# Patient Record
Sex: MALE | Race: OTHER | Employment: FULL TIME | URBAN - METROPOLITAN AREA
[De-identification: names, ages, dates, MRNs, and addresses within clinical notes are randomized per-mention and may not be internally consistent; named-entity substitution may affect disease eponyms.]

---

## 2017-12-20 DIAGNOSIS — I10 ESSENTIAL HYPERTENSION: ICD-10-CM

## 2017-12-23 NOTE — TELEPHONE ENCOUNTER
LMTCB to schedule appt with another provider    Spoke with Robert Guzman at [place] [address] and verified patient picked up #30 Losartan 50 mg 12/08/17.

## 2017-12-29 RX ORDER — LOSARTAN POTASSIUM 50 MG/1
50 TABLET ORAL
Qty: 30 TABLET | Refills: 11 | Status: CANCELLED | OUTPATIENT
Start: 2017-12-29

## 2018-01-25 ENCOUNTER — OFFICE VISIT (OUTPATIENT)
Dept: INTERNAL MEDICINE CLINIC | Facility: CLINIC | Age: 58
End: 2018-01-25

## 2018-01-25 VITALS
SYSTOLIC BLOOD PRESSURE: 147 MMHG | WEIGHT: 269 LBS | OXYGEN SATURATION: 94 % | BODY MASS INDEX: 38.51 KG/M2 | DIASTOLIC BLOOD PRESSURE: 97 MMHG | HEART RATE: 70 BPM | TEMPERATURE: 98 F | HEIGHT: 70 IN

## 2018-01-25 DIAGNOSIS — J06.9 VIRAL UPPER RESPIRATORY TRACT INFECTION: Primary | ICD-10-CM

## 2018-01-25 DIAGNOSIS — I10 ESSENTIAL HYPERTENSION: ICD-10-CM

## 2018-01-25 PROCEDURE — 99213 OFFICE O/P EST LOW 20 MIN: CPT | Performed by: INTERNAL MEDICINE

## 2018-01-25 PROCEDURE — 99212 OFFICE O/P EST SF 10 MIN: CPT | Performed by: INTERNAL MEDICINE

## 2018-01-25 RX ORDER — LOSARTAN POTASSIUM 50 MG/1
50 TABLET ORAL
Qty: 90 TABLET | Refills: 3 | Status: SHIPPED | OUTPATIENT
Start: 2018-01-25 | End: 2019-02-03

## 2018-01-25 RX ORDER — BENZONATATE 100 MG/1
100 CAPSULE ORAL 2 TIMES DAILY PRN
Qty: 10 CAPSULE | Refills: 0 | Status: SHIPPED | OUTPATIENT
Start: 2018-01-25 | End: 2018-09-04

## 2018-01-25 NOTE — PROGRESS NOTES
Luiza Pablo is a 56year old male.   Patient presents with:  URI: cough, chest tightness and nasal congestion      HPI:   Pt comes as an urgent visit   C/c uri symptoms   C/o cough x jan 10th - getting better now   Initially had tightness of chest from co CARDIOVASCULAR: denies chest pain on exertion, palpitations, swelling in feet  NEURO: denies headaches , anxiety, depression    EXAM:   /97 (BP Location: Right arm, Patient Position: Sitting, Cuff Size: large)   Pulse 70   Temp 98.3 °F (36.8 °C) (O

## 2018-09-04 ENCOUNTER — OFFICE VISIT (OUTPATIENT)
Dept: INTERNAL MEDICINE CLINIC | Facility: CLINIC | Age: 58
End: 2018-09-04
Payer: COMMERCIAL

## 2018-09-04 VITALS
WEIGHT: 269 LBS | HEIGHT: 70 IN | BODY MASS INDEX: 38.51 KG/M2 | DIASTOLIC BLOOD PRESSURE: 94 MMHG | SYSTOLIC BLOOD PRESSURE: 146 MMHG | HEART RATE: 62 BPM

## 2018-09-04 DIAGNOSIS — I10 ESSENTIAL HYPERTENSION: ICD-10-CM

## 2018-09-04 DIAGNOSIS — R21 RASH: Primary | ICD-10-CM

## 2018-09-04 PROBLEM — E66.01 CLASS 2 SEVERE OBESITY DUE TO EXCESS CALORIES WITH SERIOUS COMORBIDITY AND BODY MASS INDEX (BMI) OF 38.0 TO 38.9 IN ADULT (HCC): Status: ACTIVE | Noted: 2018-09-04

## 2018-09-04 PROBLEM — E66.01 CLASS 2 SEVERE OBESITY DUE TO EXCESS CALORIES WITH SERIOUS COMORBIDITY AND BODY MASS INDEX (BMI) OF 35.0 TO 35.9 IN ADULT: Status: ACTIVE | Noted: 2018-09-04

## 2018-09-04 PROBLEM — E66.812 CLASS 2 SEVERE OBESITY DUE TO EXCESS CALORIES WITH SERIOUS COMORBIDITY AND BODY MASS INDEX (BMI) OF 35.0 TO 35.9 IN ADULT (HCC): Status: ACTIVE | Noted: 2018-09-04

## 2018-09-04 PROBLEM — E66.01 CLASS 2 SEVERE OBESITY DUE TO EXCESS CALORIES WITH SERIOUS COMORBIDITY AND BODY MASS INDEX (BMI) OF 35.0 TO 35.9 IN ADULT  (HCC): Status: ACTIVE | Noted: 2018-09-04

## 2018-09-04 PROBLEM — E66.01 CLASS 2 SEVERE OBESITY DUE TO EXCESS CALORIES WITH SERIOUS COMORBIDITY AND BODY MASS INDEX (BMI) OF 38.0 TO 38.9 IN ADULT  (HCC): Status: ACTIVE | Noted: 2018-09-04

## 2018-09-04 PROBLEM — E66.01 CLASS 2 SEVERE OBESITY DUE TO EXCESS CALORIES WITH SERIOUS COMORBIDITY AND BODY MASS INDEX (BMI) OF 38.0 TO 38.9 IN ADULT: Status: ACTIVE | Noted: 2018-09-04

## 2018-09-04 PROBLEM — E66.01 CLASS 2 SEVERE OBESITY DUE TO EXCESS CALORIES WITH SERIOUS COMORBIDITY AND BODY MASS INDEX (BMI) OF 35.0 TO 35.9 IN ADULT (HCC): Status: ACTIVE | Noted: 2018-09-04

## 2018-09-04 PROBLEM — E66.812 CLASS 2 SEVERE OBESITY DUE TO EXCESS CALORIES WITH SERIOUS COMORBIDITY AND BODY MASS INDEX (BMI) OF 38.0 TO 38.9 IN ADULT (HCC): Status: ACTIVE | Noted: 2018-09-04

## 2018-09-04 PROCEDURE — 99212 OFFICE O/P EST SF 10 MIN: CPT | Performed by: INTERNAL MEDICINE

## 2018-09-04 PROCEDURE — 99213 OFFICE O/P EST LOW 20 MIN: CPT | Performed by: INTERNAL MEDICINE

## 2018-09-04 RX ORDER — METHYLPREDNISOLONE 4 MG/1
TABLET ORAL
Qty: 1 KIT | Refills: 0 | Status: SHIPPED | OUTPATIENT
Start: 2018-09-04 | End: 2019-02-12

## 2018-09-04 NOTE — PROGRESS NOTES
Rod Vigil is a 66year old male.   Patient presents with:  Rash Skin Problem (integumentary): was in yard 10 days ago       HPI:   Pt comes as an urgent visit   C/c rash  C/o rash -- was in the yard -- in shorts mowing the lawn and was exposed to poison kg)   BMI 38.60 kg/m²   GENERAL: well developed, well nourished,in no apparent distress  SKIN: Noted excoriation on the left lower leg-most likely from scratching mild redness also mild redness on the left upper extremity lateral aspect mostly but also on

## 2018-09-04 NOTE — PATIENT INSTRUCTIONS
Low-Salt Choices  Eating salt (sodium) can make your body retain too much water. Excess water makes your heart work harder. Canned, packaged, and frozen foods are easy to prepare. But they are often high in sodium.  Here are some ideas for low-salt foods Will give Medrol Dosepak and continue icing the area, advised not to scratch the area at all can continue with calamine lotion, he can take Benadryl at night or even also during the day 2 if he is not driving as it can make him drowsy he can also try Pepci

## 2019-02-03 DIAGNOSIS — I10 ESSENTIAL HYPERTENSION: ICD-10-CM

## 2019-02-04 RX ORDER — LOSARTAN POTASSIUM 50 MG/1
50 TABLET ORAL
Qty: 90 TABLET | Refills: 3 | Status: SHIPPED | OUTPATIENT
Start: 2019-02-04 | End: 2019-02-12

## 2019-02-04 NOTE — TELEPHONE ENCOUNTER
Please review; protocol failed.     Hypertensive Medications  Protocol Criteria:  · Appointment scheduled in the past 6 months or in the next 3 months  · BMP or CMP in the past 12 months  · Creatinine result < 2  Recent Outpatient Visits            5 months

## 2019-02-12 ENCOUNTER — OFFICE VISIT (OUTPATIENT)
Dept: INTERNAL MEDICINE CLINIC | Facility: CLINIC | Age: 59
End: 2019-02-12
Payer: COMMERCIAL

## 2019-02-12 ENCOUNTER — LAB ENCOUNTER (OUTPATIENT)
Dept: LAB | Age: 59
End: 2019-02-12
Attending: INTERNAL MEDICINE
Payer: COMMERCIAL

## 2019-02-12 VITALS
HEART RATE: 73 BPM | WEIGHT: 273 LBS | BODY MASS INDEX: 39.08 KG/M2 | DIASTOLIC BLOOD PRESSURE: 110 MMHG | HEIGHT: 70 IN | SYSTOLIC BLOOD PRESSURE: 148 MMHG

## 2019-02-12 DIAGNOSIS — B02.9 HERPES ZOSTER WITHOUT COMPLICATION: ICD-10-CM

## 2019-02-12 DIAGNOSIS — I10 ESSENTIAL HYPERTENSION: ICD-10-CM

## 2019-02-12 DIAGNOSIS — I10 ESSENTIAL HYPERTENSION: Primary | ICD-10-CM

## 2019-02-12 LAB
ALBUMIN SERPL-MCNC: 4 G/DL (ref 3.4–5)
ALBUMIN/GLOB SERPL: 1 {RATIO} (ref 1–2)
ALP LIVER SERPL-CCNC: 80 U/L (ref 45–117)
ALT SERPL-CCNC: 39 U/L (ref 16–61)
ANION GAP SERPL CALC-SCNC: 8 MMOL/L (ref 0–18)
AST SERPL-CCNC: 28 U/L (ref 15–37)
BASOPHILS # BLD AUTO: 0.05 X10(3) UL (ref 0–0.2)
BASOPHILS NFR BLD AUTO: 0.6 %
BILIRUB SERPL-MCNC: 0.5 MG/DL (ref 0.1–2)
BUN BLD-MCNC: 13 MG/DL (ref 7–18)
BUN/CREAT SERPL: 13.3 (ref 10–20)
CALCIUM BLD-MCNC: 9.1 MG/DL (ref 8.5–10.1)
CHLORIDE SERPL-SCNC: 105 MMOL/L (ref 98–107)
CO2 SERPL-SCNC: 28 MMOL/L (ref 21–32)
CREAT BLD-MCNC: 0.98 MG/DL (ref 0.7–1.3)
DEPRECATED RDW RBC AUTO: 44 FL (ref 35.1–46.3)
EOSINOPHIL # BLD AUTO: 0.16 X10(3) UL (ref 0–0.7)
EOSINOPHIL NFR BLD AUTO: 2.1 %
ERYTHROCYTE [DISTWIDTH] IN BLOOD BY AUTOMATED COUNT: 13.6 % (ref 11–15)
GLOBULIN PLAS-MCNC: 4 G/DL (ref 2.8–4.4)
GLUCOSE BLD-MCNC: 98 MG/DL (ref 70–99)
HCT VFR BLD AUTO: 45.2 % (ref 39–53)
HGB BLD-MCNC: 14.7 G/DL (ref 13–17.5)
IMM GRANULOCYTES # BLD AUTO: 0.01 X10(3) UL (ref 0–1)
IMM GRANULOCYTES NFR BLD: 0.1 %
LYMPHOCYTES # BLD AUTO: 2.39 X10(3) UL (ref 1–4)
LYMPHOCYTES NFR BLD AUTO: 30.6 %
M PROTEIN MFR SERPL ELPH: 8 G/DL (ref 6.4–8.2)
MCH RBC QN AUTO: 28.8 PG (ref 26–34)
MCHC RBC AUTO-ENTMCNC: 32.5 G/DL (ref 31–37)
MCV RBC AUTO: 88.6 FL (ref 80–100)
MONOCYTES # BLD AUTO: 0.46 X10(3) UL (ref 0.1–1)
MONOCYTES NFR BLD AUTO: 5.9 %
NEUTROPHILS # BLD AUTO: 4.73 X10 (3) UL (ref 1.5–7.7)
NEUTROPHILS # BLD AUTO: 4.73 X10(3) UL (ref 1.5–7.7)
NEUTROPHILS NFR BLD AUTO: 60.7 %
OSMOLALITY SERPL CALC.SUM OF ELEC: 292 MOSM/KG (ref 275–295)
PLATELET # BLD AUTO: 274 10(3)UL (ref 150–450)
POTASSIUM SERPL-SCNC: 4.2 MMOL/L (ref 3.5–5.1)
RBC # BLD AUTO: 5.1 X10(6)UL (ref 4.3–5.7)
SODIUM SERPL-SCNC: 141 MMOL/L (ref 136–145)
WBC # BLD AUTO: 7.8 X10(3) UL (ref 4–11)

## 2019-02-12 PROCEDURE — 80053 COMPREHEN METABOLIC PANEL: CPT

## 2019-02-12 PROCEDURE — 85025 COMPLETE CBC W/AUTO DIFF WBC: CPT

## 2019-02-12 PROCEDURE — 36415 COLL VENOUS BLD VENIPUNCTURE: CPT

## 2019-02-12 PROCEDURE — 99214 OFFICE O/P EST MOD 30 MIN: CPT | Performed by: INTERNAL MEDICINE

## 2019-02-12 PROCEDURE — 99212 OFFICE O/P EST SF 10 MIN: CPT | Performed by: INTERNAL MEDICINE

## 2019-02-12 RX ORDER — VALACYCLOVIR HYDROCHLORIDE 1 G/1
1 TABLET, FILM COATED ORAL 3 TIMES DAILY
Qty: 21 TABLET | Refills: 0 | Status: SHIPPED | OUTPATIENT
Start: 2019-02-12 | End: 2019-02-19

## 2019-02-12 RX ORDER — LOSARTAN POTASSIUM 100 MG/1
100 TABLET ORAL
Qty: 90 TABLET | Refills: 1 | Status: SHIPPED | OUTPATIENT
Start: 2019-02-12 | End: 2019-08-13

## 2019-02-12 NOTE — PROGRESS NOTES
Jennifer Guzman is a 66year old male.   Patient presents with:  Rash: back area       HPI:   Pt comes as an urgent visit   C/c rash again   C/o has been sing hydrocortisone  , used a back scratcher and that made it worse   Before the hydrocortisone his wife distress  SKIN: left side back fluid filled  lesions-on the back in a dermatomal lesion distribution and noted also it extends to the right side and also on the left chest wall just a little bit--in clusters and red  HEENT: atraumatic, normocephalic  LUNGS

## 2019-02-12 NOTE — PATIENT INSTRUCTIONS
Eating for your heart doesn’t have to be hard or boring. You just need to know how to make healthier choices. The DASH eating plan has been developed to help you do just that. DASH stands for Dietary Approaches to Stop Hypertension.  It is a plan that has b visible fat. Broil, grill, roast, or boil instead of frying. Remove skin from poultry before eating.  Limit how much red meat you eat.  Nuts, seeds, beans  Servings: 4 to 5 a week  A serving is:  · One-third cup nuts (one and a half ounces)  · 2 tablespoons salt  Stay away from:  · Sausage, roberson, and ham  · Flour tortillas  · Packaged muffins, pancakes, and biscuits  · Instant hot cereals  · Cottage cheese  For lunch and dinner  · Fresh fish, chicken, turkey, or meat—baked, broiled, or roasted without salt

## 2019-02-16 ENCOUNTER — TELEPHONE (OUTPATIENT)
Dept: OTHER | Age: 59
End: 2019-02-16

## 2019-02-16 NOTE — TELEPHONE ENCOUNTER
Pt states he was seen in the office 2/12/19 and given prescription for Valacyclovir for rash on his upper back. Pt is taking the medication as prescribed but is still having itching, worse at night time.  Pt using Calamine lotion with minimal relief, asking

## 2019-02-16 NOTE — TELEPHONE ENCOUNTER
Spoke to patient, rash is drying up with a crust, he is using calamine lotion, advised patient that he can use Benadryl cream topically and at night may use Benadryl 25-50 mg at night, advised that medication causes sedation should not be driving if drowsy

## 2019-08-13 DIAGNOSIS — I10 ESSENTIAL HYPERTENSION: ICD-10-CM

## 2019-08-14 RX ORDER — LOSARTAN POTASSIUM 100 MG/1
TABLET ORAL
Qty: 90 TABLET | Refills: 0 | Status: SHIPPED | OUTPATIENT
Start: 2019-08-14 | End: 2020-02-26

## 2020-02-19 DIAGNOSIS — I10 ESSENTIAL HYPERTENSION: ICD-10-CM

## 2020-02-20 RX ORDER — LOSARTAN POTASSIUM 100 MG/1
TABLET ORAL
Qty: 30 TABLET | Refills: 2 | OUTPATIENT
Start: 2020-02-20

## 2020-02-22 NOTE — TELEPHONE ENCOUNTER
Patient scheduled for physical.  Future Appointments   Date Time Provider Marty Interiano   2/26/2020  3:00 PM Mechelle Jackson MD [facility]

## 2020-02-26 ENCOUNTER — OFFICE VISIT (OUTPATIENT)
Dept: INTERNAL MEDICINE CLINIC | Facility: CLINIC | Age: 60
End: 2020-02-26
Payer: COMMERCIAL

## 2020-02-26 VITALS
BODY MASS INDEX: 38.94 KG/M2 | WEIGHT: 272 LBS | HEIGHT: 70 IN | DIASTOLIC BLOOD PRESSURE: 88 MMHG | SYSTOLIC BLOOD PRESSURE: 134 MMHG | HEART RATE: 93 BPM

## 2020-02-26 DIAGNOSIS — G47.33 OSA (OBSTRUCTIVE SLEEP APNEA): ICD-10-CM

## 2020-02-26 DIAGNOSIS — Z00.00 PHYSICAL EXAM: Primary | ICD-10-CM

## 2020-02-26 DIAGNOSIS — J45.20 MILD INTERMITTENT ASTHMA WITHOUT COMPLICATION: ICD-10-CM

## 2020-02-26 DIAGNOSIS — Z12.11 COLON CANCER SCREENING: ICD-10-CM

## 2020-02-26 DIAGNOSIS — R21 RASH: ICD-10-CM

## 2020-02-26 DIAGNOSIS — Z12.5 ENCOUNTER FOR SCREENING FOR MALIGNANT NEOPLASM OF PROSTATE: ICD-10-CM

## 2020-02-26 DIAGNOSIS — E66.01 CLASS 2 SEVERE OBESITY DUE TO EXCESS CALORIES WITH SERIOUS COMORBIDITY AND BODY MASS INDEX (BMI) OF 38.0 TO 38.9 IN ADULT (HCC): ICD-10-CM

## 2020-02-26 DIAGNOSIS — I10 ESSENTIAL HYPERTENSION: ICD-10-CM

## 2020-02-26 PROCEDURE — 99396 PREV VISIT EST AGE 40-64: CPT | Performed by: INTERNAL MEDICINE

## 2020-02-26 RX ORDER — ALBUTEROL SULFATE 90 UG/1
2 AEROSOL, METERED RESPIRATORY (INHALATION) EVERY 4 HOURS PRN
Qty: 3 INHALER | Refills: 1 | Status: SHIPPED | OUTPATIENT
Start: 2020-02-26

## 2020-02-26 RX ORDER — LOSARTAN POTASSIUM 100 MG/1
100 TABLET ORAL
Qty: 90 TABLET | Refills: 3 | Status: SHIPPED | OUTPATIENT
Start: 2020-02-26 | End: 2020-05-28

## 2020-02-26 NOTE — PROGRESS NOTES
Cong Esparza is a 64year old male.   Patient presents with:  Physical      HPI:   Patient comes for physical  C/C physical  C/o kenji -- 2015 sleep study but never got the cpap         Works as police -- in the department      Problem list   htn  Asthma   O GENERAL: well developed, well nourished,in no apparent distress  SKIN: + rashes- macules in arms and legs   HEENT: atraumatic, normocephalic,ears and throat are clear,healing cold sore right lower lip , no frontal or maxillary sinus tenderness, pupils eq PSA SCREEN; Future  Will check   Rash  -     DERM - INTERNAL  will refer to derm     Preventive medicine  Colonoscopy–never had one so will refer  Labs to be done once fasting  Flu shot-he can come back for it or get it at work , he is not feeling 100% tod

## 2020-02-29 ENCOUNTER — LAB ENCOUNTER (OUTPATIENT)
Dept: LAB | Age: 60
End: 2020-02-29
Attending: INTERNAL MEDICINE
Payer: COMMERCIAL

## 2020-02-29 DIAGNOSIS — Z00.00 PHYSICAL EXAM: ICD-10-CM

## 2020-02-29 DIAGNOSIS — Z12.5 ENCOUNTER FOR SCREENING FOR MALIGNANT NEOPLASM OF PROSTATE: ICD-10-CM

## 2020-02-29 LAB
ALBUMIN SERPL-MCNC: 3.5 G/DL (ref 3.4–5)
ALBUMIN/GLOB SERPL: 0.8 {RATIO} (ref 1–2)
ALP LIVER SERPL-CCNC: 70 U/L (ref 45–117)
ALT SERPL-CCNC: 42 U/L (ref 16–61)
ANION GAP SERPL CALC-SCNC: 5 MMOL/L (ref 0–18)
AST SERPL-CCNC: 20 U/L (ref 15–37)
BASOPHILS # BLD AUTO: 0.05 X10(3) UL (ref 0–0.2)
BASOPHILS NFR BLD AUTO: 0.6 %
BILIRUB SERPL-MCNC: 0.3 MG/DL (ref 0.1–2)
BUN BLD-MCNC: 15 MG/DL (ref 7–18)
BUN/CREAT SERPL: 15 (ref 10–20)
CALCIUM BLD-MCNC: 8.8 MG/DL (ref 8.5–10.1)
CHLORIDE SERPL-SCNC: 107 MMOL/L (ref 98–112)
CHOLEST SMN-MCNC: 166 MG/DL (ref ?–200)
CO2 SERPL-SCNC: 30 MMOL/L (ref 21–32)
COMPLEXED PSA SERPL-MCNC: 6.03 NG/ML (ref ?–4)
CREAT BLD-MCNC: 1 MG/DL (ref 0.7–1.3)
DEPRECATED RDW RBC AUTO: 43.6 FL (ref 35.1–46.3)
EOSINOPHIL # BLD AUTO: 0.22 X10(3) UL (ref 0–0.7)
EOSINOPHIL NFR BLD AUTO: 2.7 %
ERYTHROCYTE [DISTWIDTH] IN BLOOD BY AUTOMATED COUNT: 13.2 % (ref 11–15)
GLOBULIN PLAS-MCNC: 4.2 G/DL (ref 2.8–4.4)
GLUCOSE BLD-MCNC: 103 MG/DL (ref 70–99)
HCT VFR BLD AUTO: 46 % (ref 39–53)
HDLC SERPL-MCNC: 41 MG/DL (ref 40–59)
HGB BLD-MCNC: 14.9 G/DL (ref 13–17.5)
IMM GRANULOCYTES # BLD AUTO: 0.01 X10(3) UL (ref 0–1)
IMM GRANULOCYTES NFR BLD: 0.1 %
LDLC SERPL CALC-MCNC: 109 MG/DL (ref ?–100)
LYMPHOCYTES # BLD AUTO: 2.87 X10(3) UL (ref 1–4)
LYMPHOCYTES NFR BLD AUTO: 35 %
M PROTEIN MFR SERPL ELPH: 7.7 G/DL (ref 6.4–8.2)
MCH RBC QN AUTO: 29 PG (ref 26–34)
MCHC RBC AUTO-ENTMCNC: 32.4 G/DL (ref 31–37)
MCV RBC AUTO: 89.5 FL (ref 80–100)
MONOCYTES # BLD AUTO: 0.57 X10(3) UL (ref 0.1–1)
MONOCYTES NFR BLD AUTO: 7 %
NEUTROPHILS # BLD AUTO: 4.47 X10 (3) UL (ref 1.5–7.7)
NEUTROPHILS # BLD AUTO: 4.47 X10(3) UL (ref 1.5–7.7)
NEUTROPHILS NFR BLD AUTO: 54.6 %
NONHDLC SERPL-MCNC: 125 MG/DL (ref ?–130)
OSMOLALITY SERPL CALC.SUM OF ELEC: 295 MOSM/KG (ref 275–295)
PATIENT FASTING Y/N/NP: YES
PATIENT FASTING Y/N/NP: YES
PLATELET # BLD AUTO: 315 10(3)UL (ref 150–450)
POTASSIUM SERPL-SCNC: 4.4 MMOL/L (ref 3.5–5.1)
RBC # BLD AUTO: 5.14 X10(6)UL (ref 4.3–5.7)
SODIUM SERPL-SCNC: 142 MMOL/L (ref 136–145)
TRIGL SERPL-MCNC: 82 MG/DL (ref 30–149)
TSI SER-ACNC: 3.66 MIU/ML (ref 0.36–3.74)
VLDLC SERPL CALC-MCNC: 16 MG/DL (ref 0–30)
WBC # BLD AUTO: 8.2 X10(3) UL (ref 4–11)

## 2020-02-29 PROCEDURE — 36415 COLL VENOUS BLD VENIPUNCTURE: CPT

## 2020-02-29 PROCEDURE — 80053 COMPREHEN METABOLIC PANEL: CPT

## 2020-02-29 PROCEDURE — 84443 ASSAY THYROID STIM HORMONE: CPT

## 2020-02-29 PROCEDURE — 85025 COMPLETE CBC W/AUTO DIFF WBC: CPT

## 2020-02-29 PROCEDURE — 80061 LIPID PANEL: CPT

## 2020-03-03 DIAGNOSIS — R97.20 ELEVATED PSA: Primary | ICD-10-CM

## 2020-04-21 ENCOUNTER — TELEPHONE (OUTPATIENT)
Dept: SURGERY | Facility: CLINIC | Age: 60
End: 2020-04-21

## 2020-04-21 NOTE — TELEPHONE ENCOUNTER
LMTCB called to assist on rescheduling his appointment to June or keeping it as a phone visit due to the COVID-19 crisis and CDC recommendations.

## 2020-05-28 ENCOUNTER — TELEPHONE (OUTPATIENT)
Dept: INTERNAL MEDICINE CLINIC | Facility: CLINIC | Age: 60
End: 2020-05-28

## 2020-05-28 DIAGNOSIS — I10 ESSENTIAL HYPERTENSION: ICD-10-CM

## 2020-05-28 RX ORDER — LOSARTAN POTASSIUM 100 MG/1
100 TABLET ORAL
Qty: 90 TABLET | Refills: 3 | Status: SHIPPED | OUTPATIENT
Start: 2020-05-28 | End: 2020-08-10

## 2020-06-04 ENCOUNTER — OFFICE VISIT (OUTPATIENT)
Dept: SURGERY | Facility: CLINIC | Age: 60
End: 2020-06-04
Payer: COMMERCIAL

## 2020-06-04 VITALS
SYSTOLIC BLOOD PRESSURE: 167 MMHG | BODY MASS INDEX: 39 KG/M2 | HEART RATE: 75 BPM | WEIGHT: 272 LBS | DIASTOLIC BLOOD PRESSURE: 121 MMHG

## 2020-06-04 DIAGNOSIS — R97.20 ELEVATED PSA: Primary | ICD-10-CM

## 2020-06-04 PROCEDURE — 99244 OFF/OP CNSLTJ NEW/EST MOD 40: CPT | Performed by: UROLOGY

## 2020-06-04 RX ORDER — CIPROFLOXACIN 500 MG/1
500 TABLET, FILM COATED ORAL 2 TIMES DAILY
Qty: 6 TABLET | Refills: 0 | Status: SHIPPED | OUTPATIENT
Start: 2020-06-04 | End: 2020-10-09

## 2020-06-04 RX ORDER — CEFDINIR 300 MG/1
300 CAPSULE ORAL EVERY 12 HOURS
Qty: 6 CAPSULE | Refills: 0 | Status: SHIPPED | OUTPATIENT
Start: 2020-06-04 | End: 2020-06-07

## 2020-06-04 NOTE — PROGRESS NOTES
SUBJECTIVE:  Algie Lennox is a 64year old male who presents for a consultation at the request of, and a copy of this note will be sent to, Dr. Morro Valencia, for evaluation of  elevated PSA. He states that the problem is unchanged.  Symptoms include noted reviewed and otherwise normal.    OBJECTIVE:  BP (!) 167/121 (BP Location: Left arm, Patient Position: Sitting, Cuff Size: large)   Pulse 75   Wt 272 lb (123.4 kg)   BMI 39.03 kg/m²   He appears well, in no apparent distress.   Alert and oriented times thre

## 2020-07-21 ENCOUNTER — PROCEDURE (OUTPATIENT)
Dept: SURGERY | Facility: CLINIC | Age: 60
End: 2020-07-21
Payer: COMMERCIAL

## 2020-07-21 ENCOUNTER — TELEPHONE (OUTPATIENT)
Dept: SURGERY | Facility: CLINIC | Age: 60
End: 2020-07-21

## 2020-07-21 VITALS
SYSTOLIC BLOOD PRESSURE: 148 MMHG | TEMPERATURE: 98 F | RESPIRATION RATE: 16 BRPM | HEIGHT: 70 IN | DIASTOLIC BLOOD PRESSURE: 100 MMHG | HEART RATE: 73 BPM | BODY MASS INDEX: 38.94 KG/M2 | WEIGHT: 272 LBS

## 2020-07-21 PROCEDURE — 3080F DIAST BP >= 90 MM HG: CPT | Performed by: UROLOGY

## 2020-07-21 PROCEDURE — 3008F BODY MASS INDEX DOCD: CPT | Performed by: UROLOGY

## 2020-07-21 PROCEDURE — 3077F SYST BP >= 140 MM HG: CPT | Performed by: UROLOGY

## 2020-07-21 RX ORDER — CEFDINIR 300 MG/1
CAPSULE ORAL
Qty: 3 CAPSULE | Refills: 0 | Status: SHIPPED | OUTPATIENT
Start: 2020-07-21 | End: 2020-10-09

## 2020-07-21 RX ORDER — CIPROFLOXACIN 500 MG/1
TABLET, FILM COATED ORAL
Qty: 3 TABLET | Refills: 0 | Status: SHIPPED | OUTPATIENT
Start: 2020-07-21 | End: 2020-10-09

## 2020-07-21 NOTE — TELEPHONE ENCOUNTER
We were unable to perform pt's transrectal US with prostate biopsies today d/t his very elevated diastolic BP.  He was reschd to 9/1 and since he started the ABX prep he will need 3 more tabs of Cipro 500 mg and 3 More caps of Cefdinir 300 mg. I sent these

## 2020-07-21 NOTE — PROGRESS NOTES
Patient presented for transrectal ultrasound and prostate biopsy. His diastolic blood pressure is too high and the procedure was postponed.

## 2020-08-10 ENCOUNTER — HOSPITAL ENCOUNTER (OUTPATIENT)
Dept: GENERAL RADIOLOGY | Age: 60
Discharge: HOME | End: 2020-08-10
Attending: FAMILY MEDICINE
Payer: COMMERCIAL

## 2020-08-10 ENCOUNTER — LAB ENCOUNTER (OUTPATIENT)
Dept: LAB | Age: 60
End: 2020-08-10
Attending: FAMILY MEDICINE
Payer: COMMERCIAL

## 2020-08-10 ENCOUNTER — OFFICE VISIT (OUTPATIENT)
Dept: FAMILY MEDICINE CLINIC | Facility: CLINIC | Age: 60
End: 2020-08-10
Payer: COMMERCIAL

## 2020-08-10 ENCOUNTER — APPOINTMENT (OUTPATIENT)
Dept: LAB | Age: 60
End: 2020-08-10
Attending: FAMILY MEDICINE
Payer: COMMERCIAL

## 2020-08-10 VITALS
SYSTOLIC BLOOD PRESSURE: 168 MMHG | HEIGHT: 70 IN | DIASTOLIC BLOOD PRESSURE: 120 MMHG | BODY MASS INDEX: 37.94 KG/M2 | HEART RATE: 71 BPM | WEIGHT: 265 LBS | TEMPERATURE: 98 F

## 2020-08-10 DIAGNOSIS — I10 ESSENTIAL HYPERTENSION: Primary | ICD-10-CM

## 2020-08-10 DIAGNOSIS — I10 ESSENTIAL HYPERTENSION: ICD-10-CM

## 2020-08-10 DIAGNOSIS — E66.01 SEVERE OBESITY (BMI 35.0-35.9 WITH COMORBIDITY) (HCC): ICD-10-CM

## 2020-08-10 DIAGNOSIS — R97.20 ELEVATED PSA: ICD-10-CM

## 2020-08-10 LAB
ANION GAP SERPL CALC-SCNC: 3 MMOL/L (ref 0–18)
BILIRUB UR QL: NEGATIVE
BUN BLD-MCNC: 12 MG/DL (ref 7–18)
BUN/CREAT SERPL: 13.2 (ref 10–20)
CALCIUM BLD-MCNC: 9.3 MG/DL (ref 8.5–10.1)
CHLORIDE SERPL-SCNC: 108 MMOL/L (ref 98–112)
CO2 SERPL-SCNC: 30 MMOL/L (ref 21–32)
COLOR UR: YELLOW
CREAT BLD-MCNC: 0.91 MG/DL (ref 0.7–1.3)
GLUCOSE BLD-MCNC: 86 MG/DL (ref 70–99)
GLUCOSE UR-MCNC: NEGATIVE MG/DL
HGB UR QL STRIP.AUTO: NEGATIVE
KETONES UR-MCNC: NEGATIVE MG/DL
NITRITE UR QL STRIP.AUTO: NEGATIVE
OSMOLALITY SERPL CALC.SUM OF ELEC: 291 MOSM/KG (ref 275–295)
PATIENT FASTING Y/N/NP: NO
PH UR: 5 [PH] (ref 5–8)
POTASSIUM SERPL-SCNC: 4.1 MMOL/L (ref 3.5–5.1)
PROT UR-MCNC: NEGATIVE MG/DL
RBC #/AREA URNS AUTO: 1 /HPF
SODIUM SERPL-SCNC: 141 MMOL/L (ref 136–145)
SP GR UR STRIP: 1.03 (ref 1–1.03)
UROBILINOGEN UR STRIP-ACNC: <2
WBC #/AREA URNS AUTO: 7 /HPF

## 2020-08-10 PROCEDURE — 93005 ELECTROCARDIOGRAM TRACING: CPT

## 2020-08-10 PROCEDURE — 93010 ELECTROCARDIOGRAM REPORT: CPT | Performed by: FAMILY MEDICINE

## 2020-08-10 PROCEDURE — 80048 BASIC METABOLIC PNL TOTAL CA: CPT

## 2020-08-10 PROCEDURE — 3080F DIAST BP >= 90 MM HG: CPT | Performed by: FAMILY MEDICINE

## 2020-08-10 PROCEDURE — 71046 X-RAY EXAM CHEST 2 VIEWS: CPT | Performed by: FAMILY MEDICINE

## 2020-08-10 PROCEDURE — 81001 URINALYSIS AUTO W/SCOPE: CPT

## 2020-08-10 PROCEDURE — 87086 URINE CULTURE/COLONY COUNT: CPT

## 2020-08-10 PROCEDURE — 3008F BODY MASS INDEX DOCD: CPT | Performed by: FAMILY MEDICINE

## 2020-08-10 PROCEDURE — 36415 COLL VENOUS BLD VENIPUNCTURE: CPT

## 2020-08-10 PROCEDURE — 99203 OFFICE O/P NEW LOW 30 MIN: CPT | Performed by: FAMILY MEDICINE

## 2020-08-10 PROCEDURE — 3077F SYST BP >= 140 MM HG: CPT | Performed by: FAMILY MEDICINE

## 2020-08-10 RX ORDER — AMLODIPINE BESYLATE AND BENAZEPRIL HYDROCHLORIDE 5; 40 MG/1; MG/1
1 CAPSULE ORAL DAILY
Qty: 90 CAPSULE | Refills: 0 | Status: SHIPPED | OUTPATIENT
Start: 2020-08-10 | End: 2020-09-10

## 2020-08-10 NOTE — PROGRESS NOTES
Patient ID: Peggy Reed is a 64year old male. HPI  Patient presents with:  Physical: establish care    Telephone encounter from 7/21/2020.     Karl Mazariegos RN   Registered Nurse      Telephone Encounter   Signed   Encounter Date:  7/21/2020 2/29/2020.     [id number] Procedure with Mulugeta Yi MD   Tuesday September 1 11:00 AM  [facility], [address], [address] [place] [address] [facility]  [place] [address] [phone]          Lab Results   Component Value Date 3. 83     Wt Readings from Last 6 Encounters:  08/10/20 : 265 lb (120.2 kg)  07/21/20 : 272 lb (123.4 kg)  06/04/20 : 272 lb (123.4 kg)  02/26/20 : 272 lb (123.4 kg)  02/12/19 : 273 lb (123.8 kg)  09/04/18 : 269 lb (122 kg)      BMI Readings from Last 6 Enc mouth 2 (two) times daily. 6 tablet 0   • losartan 100 MG Oral Tab Take 1 tablet (100 mg total) by mouth once daily.  90 tablet 3   • Albuterol Sulfate HFA (PROAIR HFA) 108 (90 Base) MCG/ACT Inhalation Aero Soln Inhale 2 puffs into the lungs every 4 (four) Do labs today. Patient understands the plan. Elevated PSA  Can have a prostate biopsy until the blood pressure is better.   Severe obesity (BMI 35.0-35.9 with comorbidity) ([facility] [address]        Referrals (if applicable)  No orders of the defined types were placed

## 2020-08-26 ENCOUNTER — HOSPITAL ENCOUNTER (OUTPATIENT)
Dept: CV DIAGNOSTICS | Age: 60
Discharge: HOME | End: 2020-08-26
Attending: FAMILY MEDICINE
Payer: COMMERCIAL

## 2020-08-26 DIAGNOSIS — I77.1 TORTUOUS AORTA (HCC): ICD-10-CM

## 2020-08-26 DIAGNOSIS — I10 ESSENTIAL HYPERTENSION: ICD-10-CM

## 2020-08-26 DIAGNOSIS — E66.01 SEVERE OBESITY (BMI 35.0-35.9 WITH COMORBIDITY) (HCC): ICD-10-CM

## 2020-08-26 PROCEDURE — 93306 TTE W/DOPPLER COMPLETE: CPT | Performed by: FAMILY MEDICINE

## 2020-08-27 ENCOUNTER — OFFICE VISIT (OUTPATIENT)
Dept: FAMILY MEDICINE CLINIC | Facility: CLINIC | Age: 60
End: 2020-08-27
Payer: COMMERCIAL

## 2020-08-27 ENCOUNTER — LAB ENCOUNTER (OUTPATIENT)
Dept: LAB | Age: 60
End: 2020-08-27
Attending: FAMILY MEDICINE
Payer: COMMERCIAL

## 2020-08-27 VITALS
HEIGHT: 70 IN | DIASTOLIC BLOOD PRESSURE: 90 MMHG | SYSTOLIC BLOOD PRESSURE: 120 MMHG | TEMPERATURE: 98 F | HEART RATE: 74 BPM | WEIGHT: 264 LBS | BODY MASS INDEX: 37.8 KG/M2

## 2020-08-27 DIAGNOSIS — R97.20 ELEVATED PSA: ICD-10-CM

## 2020-08-27 DIAGNOSIS — I10 ESSENTIAL HYPERTENSION: ICD-10-CM

## 2020-08-27 DIAGNOSIS — I10 ESSENTIAL HYPERTENSION: Primary | ICD-10-CM

## 2020-08-27 LAB
ANION GAP SERPL CALC-SCNC: 4 MMOL/L (ref 0–18)
BUN BLD-MCNC: 14 MG/DL (ref 7–18)
BUN/CREAT SERPL: 15.4 (ref 10–20)
CALCIUM BLD-MCNC: 9.2 MG/DL (ref 8.5–10.1)
CHLORIDE SERPL-SCNC: 106 MMOL/L (ref 98–112)
CO2 SERPL-SCNC: 30 MMOL/L (ref 21–32)
CREAT BLD-MCNC: 0.91 MG/DL (ref 0.7–1.3)
GLUCOSE BLD-MCNC: 98 MG/DL (ref 70–99)
OSMOLALITY SERPL CALC.SUM OF ELEC: 290 MOSM/KG (ref 275–295)
PATIENT FASTING Y/N/NP: YES
POTASSIUM SERPL-SCNC: 4.1 MMOL/L (ref 3.5–5.1)
SODIUM SERPL-SCNC: 140 MMOL/L (ref 136–145)

## 2020-08-27 PROCEDURE — 3074F SYST BP LT 130 MM HG: CPT | Performed by: FAMILY MEDICINE

## 2020-08-27 PROCEDURE — 36415 COLL VENOUS BLD VENIPUNCTURE: CPT

## 2020-08-27 PROCEDURE — 99214 OFFICE O/P EST MOD 30 MIN: CPT | Performed by: FAMILY MEDICINE

## 2020-08-27 PROCEDURE — 80048 BASIC METABOLIC PNL TOTAL CA: CPT

## 2020-08-27 PROCEDURE — 3080F DIAST BP >= 90 MM HG: CPT | Performed by: FAMILY MEDICINE

## 2020-08-27 PROCEDURE — 3008F BODY MASS INDEX DOCD: CPT | Performed by: FAMILY MEDICINE

## 2020-08-27 NOTE — PROGRESS NOTES
Patient ID: Ty Cockayne is a 64year old male. HPI  Patient presents with: Follow - Up    Last seen by me on 8/10/2020. Pt sees Dr. Taylor Hernandez for urology.  He was due to have a transrectal US and prostate biopsy on 7/21/2020, but the procedure was re comparison. -------------------------------------------------------------------    -------------------------------------------------------------------  Findings  Left ventricle:   The cavity size was at the upper limits of normal.  Wall thickness was mine No acute appearing disease. Worsening moderate tortuosity ascending and descending thoracic aorta.           Dictated by (CST): Lauren Burks MD on 8/10/2020 at 4:36 PM       Finalized by (CST): Lauren Burks MD on 8/10/2020 at 4:38 PM     ============== • Ciprofloxacin HCl 500 MG Oral Tab Take 1 tablet (500 mg total) by mouth 2 (two) times daily.  6 tablet 0   • Albuterol Sulfate HFA (PROAIR HFA) 108 (90 Base) MCG/ACT Inhalation Aero Soln Inhale 2 puffs into the lungs every 4 (four) hours as needed for [place],  attest that this documentation has been prepared under the direction and in the presence of Oumar Nunez DO.    Electronically Signed: Alonzo Hernandez, 8/27/2020, 8:22 AM.    Dontae GREGG DO,  personally performed the services desc

## 2020-09-01 ENCOUNTER — PROCEDURE (OUTPATIENT)
Dept: SURGERY | Facility: CLINIC | Age: 60
End: 2020-09-01
Payer: COMMERCIAL

## 2020-09-01 VITALS
TEMPERATURE: 99 F | HEIGHT: 70 IN | DIASTOLIC BLOOD PRESSURE: 84 MMHG | HEART RATE: 68 BPM | BODY MASS INDEX: 37.8 KG/M2 | RESPIRATION RATE: 16 BRPM | WEIGHT: 264 LBS | SYSTOLIC BLOOD PRESSURE: 130 MMHG

## 2020-09-01 DIAGNOSIS — R97.20 ELEVATED PSA: Primary | ICD-10-CM

## 2020-09-01 PROCEDURE — 3075F SYST BP GE 130 - 139MM HG: CPT | Performed by: UROLOGY

## 2020-09-01 PROCEDURE — 3008F BODY MASS INDEX DOCD: CPT | Performed by: UROLOGY

## 2020-09-01 PROCEDURE — 55700 BIOPSY OF PROSTATE,NEEDLE/PUNCH: CPT | Performed by: UROLOGY

## 2020-09-01 PROCEDURE — 76942 ECHO GUIDE FOR BIOPSY: CPT | Performed by: UROLOGY

## 2020-09-01 PROCEDURE — 3079F DIAST BP 80-89 MM HG: CPT | Performed by: UROLOGY

## 2020-09-01 NOTE — PROGRESS NOTES
Avtar Baez is a 64year old male. HPI:   Patient presents with:  elevated psa: transrectal ultrasound with prostate biopsies     28-year-old male presents for transrectal ultrasound and prostate biopsy.   Noted to have a persistently elevated serum PS PHYSICAL EXAM:   Diagnosis:  Elevated PSA  Procedure: Transrectal ultrasound and prostate biopsy.    Anesthesia: 2% viscous lidocaine gel, 1% lidocaine 7 Ml   Prostate Volume: 26 g  Prostate US abnormalities: None  Seminal Vesicles: Grossly normal  Bl

## 2020-09-10 DIAGNOSIS — I10 ESSENTIAL HYPERTENSION: ICD-10-CM

## 2020-09-11 ENCOUNTER — TELEPHONE (OUTPATIENT)
Dept: SURGERY | Facility: CLINIC | Age: 60
End: 2020-09-11

## 2020-09-11 NOTE — TELEPHONE ENCOUNTER
Pt called stating pt received a call from dr. Adela Gordon yesterday 9-10-20. Advised pt would be receiving a call from the office to schedule an appointment. Pt is available Monday 9-14-20 and Tuesday 9-15-20. Pt does have appointment scheduled 9-15-20 at 5:00 pm telephone appointment.   Please call pt to advise

## 2020-09-11 NOTE — TELEPHONE ENCOUNTER
Spoke with patient ( verified)--states he missed a call from Dr. Pelayo Lay office this morning. \"My wife went on MyChart and requested a refill, but I told her I am ok for now. \"    Unable to locate documentation that call was made today--patient confir

## 2020-09-13 ENCOUNTER — TELEPHONE (OUTPATIENT)
Dept: SURGERY | Facility: CLINIC | Age: 60
End: 2020-09-13

## 2020-09-13 NOTE — TELEPHONE ENCOUNTER
----- Message from Aki Larson MD sent at 9/9/2020 10:51 AM CDT -----  Called pt again. Left VM,  No details provided about his prostate biopsy. Staff please call the patient and schedule in person visit Friday this week for a cancer discussion at noon.   Thanks

## 2020-09-13 NOTE — TELEPHONE ENCOUNTER
Tried to reach pt again on the hm/cell # and had to [place] on Monday as [facility] tried to reach him by phone for test results. Also trid to reach pt at the work # listed but he was not in. PT has an appt for Tues. 9/15 at 5 pm for a 20 min slot only. Tasked to [facility] [place], [place] for his info.

## 2020-09-14 RX ORDER — AMLODIPINE BESYLATE AND BENAZEPRIL HYDROCHLORIDE 5; 40 MG/1; MG/1
1 CAPSULE ORAL DAILY
Qty: 90 CAPSULE | Refills: 0 | Status: SHIPPED | OUTPATIENT
Start: 2020-09-14 | End: 2020-10-27

## 2020-09-15 ENCOUNTER — VIRTUAL PHONE E/M (OUTPATIENT)
Dept: SURGERY | Facility: CLINIC | Age: 60
End: 2020-09-15
Payer: COMMERCIAL

## 2020-09-15 DIAGNOSIS — C61 PROSTATE CANCER (HCC): Primary | ICD-10-CM

## 2020-09-15 PROCEDURE — 99443 PHONE E/M BY PHYS 21-30 MIN: CPT | Performed by: UROLOGY

## 2020-09-16 NOTE — PROGRESS NOTES
Virtual Telephone Check-In    Malathi Anne verbally consents to a Virtual/Telephone Check-In visit on 09/16/20. Patient has been referred to the [facility] website at [email or web address] to review the yearly Consent to Treat document.     Patient Bard Sample such as bleeding, infection, cardiac respiratory or blood clot complications. Long-term side effects of incontinence and impotence were also discussed with the patient and he understands.   With radiation therapy, this can be in the form of external beam r

## 2020-09-18 ENCOUNTER — TELEPHONE (OUTPATIENT)
Dept: SURGERY | Facility: CLINIC | Age: 60
End: 2020-09-18

## 2020-09-21 ENCOUNTER — PATIENT MESSAGE (OUTPATIENT)
Dept: SURGERY | Facility: CLINIC | Age: 60
End: 2020-09-21

## 2020-09-29 NOTE — TELEPHONE ENCOUNTER
Below is patient's message sent via [employer]:   From: Trey Bain  To: Laly Gordillo MD  Sent: 9/21/2020 12:48 PM CDT  Subject: Visit Follow-up Question    I made an appointment via my chart. This is not untill Oct 26.   I was hoping to see Ramandeep tenorio

## 2020-10-08 ENCOUNTER — OFFICE VISIT (OUTPATIENT)
Dept: SURGERY | Facility: CLINIC | Age: 60
End: 2020-10-08
Payer: COMMERCIAL

## 2020-10-08 VITALS
SYSTOLIC BLOOD PRESSURE: 130 MMHG | HEART RATE: 71 BPM | DIASTOLIC BLOOD PRESSURE: 91 MMHG | BODY MASS INDEX: 38 KG/M2 | WEIGHT: 264 LBS | RESPIRATION RATE: 18 BRPM

## 2020-10-08 DIAGNOSIS — C61 PROSTATE CANCER (HCC): Primary | ICD-10-CM

## 2020-10-08 PROCEDURE — 99213 OFFICE O/P EST LOW 20 MIN: CPT | Performed by: UROLOGY

## 2020-10-08 PROCEDURE — 3080F DIAST BP >= 90 MM HG: CPT | Performed by: UROLOGY

## 2020-10-08 PROCEDURE — 3075F SYST BP GE 130 - 139MM HG: CPT | Performed by: UROLOGY

## 2020-10-08 NOTE — PROGRESS NOTES
Diony Neal is a 64year old male.     HPI:   Patient presents with:  Prostate Cancer      46-year-old male in follow-up to a phone visit September 15, 2020 he was diagnosed with low volume [place] 3+3 prostate cancer 10% of the tissue without perineural procedure. (Patient not taking: Reported on 10/8/2020 ) 3 capsule 0   • Ciprofloxacin HCl 500 MG Oral Tab Take 1 tablet (500 mg total) by mouth 2 (two) times daily.  (Patient not taking: Reported on 10/8/2020 ) 6 tablet 0       Allergies:  No Known Allergie

## 2020-10-09 ENCOUNTER — OFFICE VISIT (OUTPATIENT)
Dept: SURGERY | Facility: CLINIC | Age: 60
End: 2020-10-09
Payer: COMMERCIAL

## 2020-10-09 VITALS
WEIGHT: 260 LBS | BODY MASS INDEX: 37 KG/M2 | HEART RATE: 69 BPM | SYSTOLIC BLOOD PRESSURE: 127 MMHG | DIASTOLIC BLOOD PRESSURE: 90 MMHG

## 2020-10-09 DIAGNOSIS — R39.89 OTHER SYMPTOMS AND SIGNS INVOLVING THE GENITOURINARY SYSTEM: ICD-10-CM

## 2020-10-09 DIAGNOSIS — Z01.818 PREOP EXAMINATION: ICD-10-CM

## 2020-10-09 DIAGNOSIS — Z51.81 MONITORING FOR ANTICOAGULANT USE: ICD-10-CM

## 2020-10-09 DIAGNOSIS — C61 PROSTATE CANCER (HCC): Primary | ICD-10-CM

## 2020-10-09 DIAGNOSIS — Z79.01 MONITORING FOR ANTICOAGULANT USE: ICD-10-CM

## 2020-10-09 PROCEDURE — 3074F SYST BP LT 130 MM HG: CPT | Performed by: UROLOGY

## 2020-10-09 PROCEDURE — 99215 OFFICE O/P EST HI 40 MIN: CPT | Performed by: UROLOGY

## 2020-10-09 PROCEDURE — 3080F DIAST BP >= 90 MM HG: CPT | Performed by: UROLOGY

## 2020-10-09 NOTE — PROGRESS NOTES
Patient seen in office today, scheduled for Robotic assisted Laparoscopic Radical prostatectomy, possible open, Tuesday 11/24/2020, [facility]/outpatient, went over pre-op instructions, scheduling number provided, all questions answered, verbalized

## 2020-10-09 NOTE — PROGRESS NOTES
[facility], [facility] Urology  Initial Office Consultation    HPI:   Cong Esparza is a 64year old male here today for consultation at the request of, and a copy of this note will be sent to, Carolyn Kaminski DO and Sarahi Hanks MD. He is accompanied by his wif Size: adult)   Pulse 69   Wt 260 lb (117.9 kg)   BMI 37.31 kg/m²     Physical Exam    Constitutional: He is oriented to person, place, and time. He appears well-developed and well-nourished. No distress. HENT:   Head: Normocephalic and atraumatic.    Eyes left apex/left lateral apex; needle core biopsy:  · Prostatic adenocarcinoma, Ferriday score 3+3=6 (grade group 1), present in 1 of 2 tissue cores, and occupying approximately 10% of the prostatic tissue  · Keratin [id number] and p63 stains are examined, javier prostate MRI), and repeat biopsies in order to monitor for potential disease progression. Patient understands that despite vigilant follow-up there will be cases where the cancer spreads and is no longer potentially curable with local therapies.   Joshua Twin erectile function. I explained that concurrent pelvic lymphadenectomy is typically performed for higher risk disease, which serves both a diagnostic and potentially therapeutic purpose if metastatic disease is identified.  While in the majority of cases january willingness to accept potential sexual dysfunction to minimize voiding bother, or vice-versa.       They understand the final choice of therapy is based on an assessment of the relative medical risks and benefits of each treatment as well as the potential q

## 2020-10-22 ENCOUNTER — LAB ENCOUNTER (OUTPATIENT)
Dept: LAB | Facility: HOSPITAL | Age: 60
End: 2020-10-22
Attending: UROLOGY
Payer: COMMERCIAL

## 2020-10-22 ENCOUNTER — HOSPITAL ENCOUNTER (OUTPATIENT)
Dept: MRI IMAGING | Facility: HOSPITAL | Age: 60
Discharge: HOME | End: 2020-10-22
Attending: UROLOGY
Payer: COMMERCIAL

## 2020-10-22 DIAGNOSIS — Z01.818 PREOP EXAMINATION: ICD-10-CM

## 2020-10-22 DIAGNOSIS — Z79.01 MONITORING FOR ANTICOAGULANT USE: ICD-10-CM

## 2020-10-22 DIAGNOSIS — Z51.81 MONITORING FOR ANTICOAGULANT USE: ICD-10-CM

## 2020-10-22 DIAGNOSIS — R39.89 OTHER SYMPTOMS AND SIGNS INVOLVING THE GENITOURINARY SYSTEM: ICD-10-CM

## 2020-10-22 DIAGNOSIS — C61 PROSTATE CANCER (HCC): ICD-10-CM

## 2020-10-22 PROCEDURE — 86901 BLOOD TYPING SEROLOGIC RH(D): CPT

## 2020-10-22 PROCEDURE — 85730 THROMBOPLASTIN TIME PARTIAL: CPT

## 2020-10-22 PROCEDURE — 85025 COMPLETE CBC W/AUTO DIFF WBC: CPT

## 2020-10-22 PROCEDURE — 72197 MRI PELVIS W/O & W/DYE: CPT | Performed by: UROLOGY

## 2020-10-22 PROCEDURE — 86900 BLOOD TYPING SEROLOGIC ABO: CPT

## 2020-10-22 PROCEDURE — 80048 BASIC METABOLIC PNL TOTAL CA: CPT

## 2020-10-22 PROCEDURE — 81003 URINALYSIS AUTO W/O SCOPE: CPT

## 2020-10-22 PROCEDURE — 82565 ASSAY OF CREATININE: CPT

## 2020-10-22 PROCEDURE — A9575 INJ GADOTERATE MEGLUMI 0.1ML: HCPCS | Performed by: UROLOGY

## 2020-10-22 PROCEDURE — 87086 URINE CULTURE/COLONY COUNT: CPT | Performed by: UROLOGY

## 2020-10-22 PROCEDURE — 86850 RBC ANTIBODY SCREEN: CPT

## 2020-10-22 PROCEDURE — 36415 COLL VENOUS BLD VENIPUNCTURE: CPT

## 2020-10-22 PROCEDURE — 85610 PROTHROMBIN TIME: CPT

## 2020-10-27 ENCOUNTER — TELEPHONE (OUTPATIENT)
Dept: FAMILY MEDICINE CLINIC | Facility: CLINIC | Age: 60
End: 2020-10-27

## 2020-10-27 ENCOUNTER — OFFICE VISIT (OUTPATIENT)
Dept: FAMILY MEDICINE CLINIC | Facility: CLINIC | Age: 60
End: 2020-10-27
Payer: COMMERCIAL

## 2020-10-27 VITALS
DIASTOLIC BLOOD PRESSURE: 92 MMHG | TEMPERATURE: 99 F | BODY MASS INDEX: 37.97 KG/M2 | SYSTOLIC BLOOD PRESSURE: 128 MMHG | WEIGHT: 265.19 LBS | HEART RATE: 75 BPM | HEIGHT: 70 IN

## 2020-10-27 DIAGNOSIS — Z20.822 ENCOUNTER FOR PREOPERATIVE SCREENING LABORATORY TESTING FOR COVID-19 VIRUS: ICD-10-CM

## 2020-10-27 DIAGNOSIS — L74.0 HEAT RASH: ICD-10-CM

## 2020-10-27 DIAGNOSIS — Z01.818 PREOP EXAMINATION: ICD-10-CM

## 2020-10-27 DIAGNOSIS — I10 ESSENTIAL HYPERTENSION: ICD-10-CM

## 2020-10-27 DIAGNOSIS — L30.9 DERMATITIS: ICD-10-CM

## 2020-10-27 DIAGNOSIS — Z01.812 ENCOUNTER FOR PREOPERATIVE SCREENING LABORATORY TESTING FOR COVID-19 VIRUS: ICD-10-CM

## 2020-10-27 DIAGNOSIS — Z97.2 WEARS PARTIAL DENTURES: ICD-10-CM

## 2020-10-27 DIAGNOSIS — Z23 NEED FOR VACCINATION: ICD-10-CM

## 2020-10-27 DIAGNOSIS — C61 PROSTATE CANCER (HCC): Primary | ICD-10-CM

## 2020-10-27 PROCEDURE — 90715 TDAP VACCINE 7 YRS/> IM: CPT | Performed by: FAMILY MEDICINE

## 2020-10-27 PROCEDURE — 3074F SYST BP LT 130 MM HG: CPT | Performed by: FAMILY MEDICINE

## 2020-10-27 PROCEDURE — 90472 IMMUNIZATION ADMIN EACH ADD: CPT | Performed by: FAMILY MEDICINE

## 2020-10-27 PROCEDURE — 3008F BODY MASS INDEX DOCD: CPT | Performed by: FAMILY MEDICINE

## 2020-10-27 PROCEDURE — 99244 OFF/OP CNSLTJ NEW/EST MOD 40: CPT | Performed by: FAMILY MEDICINE

## 2020-10-27 PROCEDURE — 90471 IMMUNIZATION ADMIN: CPT | Performed by: FAMILY MEDICINE

## 2020-10-27 PROCEDURE — 90732 PPSV23 VACC 2 YRS+ SUBQ/IM: CPT | Performed by: FAMILY MEDICINE

## 2020-10-27 PROCEDURE — 3080F DIAST BP >= 90 MM HG: CPT | Performed by: FAMILY MEDICINE

## 2020-10-27 RX ORDER — HYDROXYZINE HYDROCHLORIDE 10 MG/1
10 TABLET, FILM COATED ORAL NIGHTLY PRN
Qty: 10 TABLET | Refills: 0 | Status: SHIPPED | OUTPATIENT
Start: 2020-10-27 | End: 2020-11-06

## 2020-10-27 RX ORDER — AMLODIPINE BESYLATE AND BENAZEPRIL HYDROCHLORIDE 5; 40 MG/1; MG/1
1 CAPSULE ORAL DAILY
Qty: 90 CAPSULE | Refills: 1 | Status: ON HOLD | OUTPATIENT
Start: 2020-10-27 | End: 2020-11-28

## 2020-10-27 NOTE — PATIENT INSTRUCTIONS
[facility] paperwork would come to our [facility] department at [address] [place] [address]. [facility]'s fax number is [phone].

## 2020-10-27 NOTE — PROGRESS NOTES
Patient ID: Peggy Reed is a 64year old male. HPI  Patient presents with:  Pre-Op Exam: prostatectomy    Pt presents for pre-operative clearance for robotic assisted laparoscopic radical prostatectomy per Dr. Sherryle Poot on 11/24/2020.   His lab work wa within the prostate gland to suggest a clinically significant prostate cancer. 2. No evidence of extracapsular extension of tumor, pelvic lymphadenopathy or osseous metastases.   3. Prostate gland is normal in size and demonstrates mild benign prostatic hy GLU 92 10/22/2020    BUN 14 10/22/2020    CREATSERUM 0.93 10/22/2020    BUNCREA 15.1 10/22/2020    ANIONGAP 3 10/22/2020    GFRAA 104 10/22/2020    GFRNAA 90 10/22/2020    CA 8.9 10/22/2020     10/22/2020    K 4.3 10/22/2020     10/22/2020    C bruise/bleed easily. Past Medical History:   Diagnosis Date   • Asthma    • Lipid screening 9/15/2012    per NG   • Pneumonia 2008   • Prostate cancer ([facility] [address] 09/01/2020    Shamar 3+3=6 1/12 cores, 10% involvement.         Past Surgical History:   Pr coffee, soda, 2 cups daily        Occupational Exposure: Not Asked        Hobby Hazards: Not Asked        Sleep Concern: Not Asked        Stress Concern: Not Asked        Weight Concern: Not Asked        Special Diet: Not Asked        Back Care: Not Asked 10\" (1.778 m), weight 265 lb 3.2 oz (120.3 kg).      10/27/20  1045 10/27/20  1109   BP: 124/89 (!) 128/92   Pulse: 75    Temp: 98.7 °F (37.1 °C)    TempSrc: Oral    Weight: 265 lb 3.2 oz (120.3 kg)    Height: 5' 10\" (1.778 m)             ASSESSMENT/PLAN: placed in this encounter. Follow up if symptoms persist.  Take medicine (if given) as prescribed. Approach to treatment discussed and patient/family member understands and agrees to plan. No follow-ups on file.     Patient Instructions   TROY ozuna

## 2020-11-05 NOTE — TELEPHONE ENCOUNTER
LM for 1st day off for [facility].  Working on form and one date on [facility] [facility] [employer] and another on [employer]

## 2020-11-06 NOTE — TELEPHONE ENCOUNTER
Dr. Renie Closs,     Please sign off on form:  -Highlight the patient and hit \"Chart\" button. -In Chart Review, w/in the Encounter tab - click 1 time on the Telephone call encounter for 10/27/20 Scroll down the telephone encounter.  -Click \"scan on\" blue Hyperlink under \"Media\" heading for [employer]ML[employer] Dr. Renie Closs 11/6/20 w/in the telephone enc.  -Click on Acknowledge button at the bottom right corner and left-click onto image, signature stamp appears and drag signature to Provider signature line. Stamp will turn blue. Close window.      Thank you,      Santy Buitrago

## 2020-11-13 ENCOUNTER — TELEPHONE (OUTPATIENT)
Dept: SURGERY | Facility: CLINIC | Age: 60
End: 2020-11-13

## 2020-11-21 ENCOUNTER — LAB ENCOUNTER (OUTPATIENT)
Dept: LAB | Facility: HOSPITAL | Age: 60
End: 2020-11-21
Attending: FAMILY MEDICINE
Payer: COMMERCIAL

## 2020-11-21 DIAGNOSIS — Z01.818 PREOP EXAMINATION: ICD-10-CM

## 2020-11-21 DIAGNOSIS — Z01.812 ENCOUNTER FOR PREOPERATIVE SCREENING LABORATORY TESTING FOR COVID-19 VIRUS: ICD-10-CM

## 2020-11-21 DIAGNOSIS — Z01.818 PREOP TESTING: ICD-10-CM

## 2020-11-21 DIAGNOSIS — Z20.822 ENCOUNTER FOR PREOPERATIVE SCREENING LABORATORY TESTING FOR COVID-19 VIRUS: ICD-10-CM

## 2020-11-21 PROCEDURE — 86850 RBC ANTIBODY SCREEN: CPT

## 2020-11-21 PROCEDURE — 86900 BLOOD TYPING SEROLOGIC ABO: CPT

## 2020-11-21 PROCEDURE — 36415 COLL VENOUS BLD VENIPUNCTURE: CPT

## 2020-11-21 PROCEDURE — 86901 BLOOD TYPING SEROLOGIC RH(D): CPT

## 2020-11-24 ENCOUNTER — ANESTHESIA (OUTPATIENT)
Dept: SURGERY | Facility: HOSPITAL | Age: 60
End: 2020-11-24
Payer: COMMERCIAL

## 2020-11-24 ENCOUNTER — ANESTHESIA EVENT (OUTPATIENT)
Dept: SURGERY | Facility: HOSPITAL | Age: 60
End: 2020-11-24
Payer: COMMERCIAL

## 2020-11-24 ENCOUNTER — HOSPITAL ENCOUNTER (OUTPATIENT)
Facility: HOSPITAL | Age: 60
Discharge: HOME HEALTH | End: 2020-11-28
Attending: UROLOGY | Admitting: UROLOGY
Payer: COMMERCIAL

## 2020-11-24 DIAGNOSIS — Z01.818 PREOP TESTING: Primary | ICD-10-CM

## 2020-11-24 DIAGNOSIS — C61 PROSTATE CANCER (HCC): ICD-10-CM

## 2020-11-24 PROBLEM — G47.33 OSA (OBSTRUCTIVE SLEEP APNEA): Chronic | Status: ACTIVE | Noted: 2020-11-24

## 2020-11-24 PROCEDURE — 55866 LAPS SURG PRST8ECT RPBIC RAD: CPT | Performed by: UROLOGY

## 2020-11-24 PROCEDURE — 0VT04ZZ RESECTION OF PROSTATE, PERCUTANEOUS ENDOSCOPIC APPROACH: ICD-10-PCS | Performed by: UROLOGY

## 2020-11-24 PROCEDURE — 8E0W4CZ ROBOTIC ASSISTED PROCEDURE OF TRUNK REGION, PERCUTANEOUS ENDOSCOPIC APPROACH: ICD-10-PCS | Performed by: UROLOGY

## 2020-11-24 PROCEDURE — 99202 OFFICE O/P NEW SF 15 MIN: CPT | Performed by: HOSPITALIST

## 2020-11-24 RX ORDER — KETOROLAC TROMETHAMINE 15 MG/ML
15 INJECTION, SOLUTION INTRAMUSCULAR; INTRAVENOUS EVERY 6 HOURS PRN
Status: DISCONTINUED | OUTPATIENT
Start: 2020-11-24 | End: 2020-11-25

## 2020-11-24 RX ORDER — SODIUM CHLORIDE, SODIUM LACTATE, POTASSIUM CHLORIDE, CALCIUM CHLORIDE 600; 310; 30; 20 MG/100ML; MG/100ML; MG/100ML; MG/100ML
INJECTION, SOLUTION INTRAVENOUS CONTINUOUS
Status: DISCONTINUED | OUTPATIENT
Start: 2020-11-24 | End: 2020-11-26

## 2020-11-24 RX ORDER — MORPHINE SULFATE 10 MG/ML
6 INJECTION, SOLUTION INTRAMUSCULAR; INTRAVENOUS EVERY 10 MIN PRN
Status: DISCONTINUED | OUTPATIENT
Start: 2020-11-24 | End: 2020-11-24 | Stop reason: HOSPADM

## 2020-11-24 RX ORDER — SODIUM CHLORIDE, SODIUM LACTATE, POTASSIUM CHLORIDE, CALCIUM CHLORIDE 600; 310; 30; 20 MG/100ML; MG/100ML; MG/100ML; MG/100ML
INJECTION, SOLUTION INTRAVENOUS CONTINUOUS
Status: DISCONTINUED | OUTPATIENT
Start: 2020-11-24 | End: 2020-11-24

## 2020-11-24 RX ORDER — DEXAMETHASONE SODIUM PHOSPHATE 4 MG/ML
VIAL (ML) INJECTION AS NEEDED
Status: DISCONTINUED | OUTPATIENT
Start: 2020-11-24 | End: 2020-11-24 | Stop reason: SURG

## 2020-11-24 RX ORDER — NALOXONE HYDROCHLORIDE 0.4 MG/ML
80 INJECTION, SOLUTION INTRAMUSCULAR; INTRAVENOUS; SUBCUTANEOUS AS NEEDED
Status: DISCONTINUED | OUTPATIENT
Start: 2020-11-24 | End: 2020-11-24 | Stop reason: HOSPADM

## 2020-11-24 RX ORDER — AMLODIPINE BESYLATE AND BENAZEPRIL HYDROCHLORIDE 5; 40 MG/1; MG/1
1 CAPSULE ORAL DAILY
Status: DISCONTINUED | OUTPATIENT
Start: 2020-11-25 | End: 2020-11-24

## 2020-11-24 RX ORDER — HALOPERIDOL 5 MG/ML
0.25 INJECTION INTRAMUSCULAR ONCE AS NEEDED
Status: DISCONTINUED | OUTPATIENT
Start: 2020-11-24 | End: 2020-11-24 | Stop reason: HOSPADM

## 2020-11-24 RX ORDER — ENOXAPARIN SODIUM 100 MG/ML
40 INJECTION SUBCUTANEOUS DAILY
Status: DISCONTINUED | OUTPATIENT
Start: 2020-11-24 | End: 2020-11-28

## 2020-11-24 RX ORDER — ALBUTEROL SULFATE 90 UG/1
2 AEROSOL, METERED RESPIRATORY (INHALATION) EVERY 4 HOURS PRN
Status: DISCONTINUED | OUTPATIENT
Start: 2020-11-24 | End: 2020-11-28

## 2020-11-24 RX ORDER — OXYCODONE HYDROCHLORIDE 5 MG/1
TABLET ORAL EVERY 6 HOURS PRN
Status: DISCONTINUED | OUTPATIENT
Start: 2020-11-24 | End: 2020-11-25

## 2020-11-24 RX ORDER — MIDAZOLAM HYDROCHLORIDE 1 MG/ML
INJECTION INTRAMUSCULAR; INTRAVENOUS AS NEEDED
Status: DISCONTINUED | OUTPATIENT
Start: 2020-11-24 | End: 2020-11-24 | Stop reason: SURG

## 2020-11-24 RX ORDER — GLYCOPYRROLATE 0.2 MG/ML
INJECTION, SOLUTION INTRAMUSCULAR; INTRAVENOUS AS NEEDED
Status: DISCONTINUED | OUTPATIENT
Start: 2020-11-24 | End: 2020-11-24 | Stop reason: SURG

## 2020-11-24 RX ORDER — METRONIDAZOLE 500 MG/100ML
500 INJECTION, SOLUTION INTRAVENOUS ONCE
Status: COMPLETED | OUTPATIENT
Start: 2020-11-24 | End: 2020-11-24

## 2020-11-24 RX ORDER — METOCLOPRAMIDE 10 MG/1
10 TABLET ORAL ONCE
Status: COMPLETED | OUTPATIENT
Start: 2020-11-24 | End: 2020-11-24

## 2020-11-24 RX ORDER — HYDROMORPHONE HYDROCHLORIDE 1 MG/ML
0.6 INJECTION, SOLUTION INTRAMUSCULAR; INTRAVENOUS; SUBCUTANEOUS EVERY 5 MIN PRN
Status: DISCONTINUED | OUTPATIENT
Start: 2020-11-24 | End: 2020-11-24 | Stop reason: HOSPADM

## 2020-11-24 RX ORDER — MORPHINE SULFATE 4 MG/ML
4 INJECTION, SOLUTION INTRAMUSCULAR; INTRAVENOUS EVERY 10 MIN PRN
Status: DISCONTINUED | OUTPATIENT
Start: 2020-11-24 | End: 2020-11-24 | Stop reason: HOSPADM

## 2020-11-24 RX ORDER — ROCURONIUM BROMIDE 10 MG/ML
INJECTION, SOLUTION INTRAVENOUS AS NEEDED
Status: DISCONTINUED | OUTPATIENT
Start: 2020-11-24 | End: 2020-11-24 | Stop reason: SURG

## 2020-11-24 RX ORDER — ACETAMINOPHEN 10 MG/ML
1000 INJECTION, SOLUTION INTRAVENOUS EVERY 8 HOURS
Status: COMPLETED | OUTPATIENT
Start: 2020-11-24 | End: 2020-11-25

## 2020-11-24 RX ORDER — CEFAZOLIN SODIUM/WATER 2 G/20 ML
2 SYRINGE (ML) INTRAVENOUS EVERY 8 HOURS
Status: COMPLETED | OUTPATIENT
Start: 2020-11-24 | End: 2020-11-24

## 2020-11-24 RX ORDER — MORPHINE SULFATE 4 MG/ML
2 INJECTION, SOLUTION INTRAMUSCULAR; INTRAVENOUS EVERY 10 MIN PRN
Status: DISCONTINUED | OUTPATIENT
Start: 2020-11-24 | End: 2020-11-24 | Stop reason: HOSPADM

## 2020-11-24 RX ORDER — HYDROCODONE BITARTRATE AND ACETAMINOPHEN 5; 325 MG/1; MG/1
1 TABLET ORAL AS NEEDED
Status: DISCONTINUED | OUTPATIENT
Start: 2020-11-24 | End: 2020-11-24 | Stop reason: HOSPADM

## 2020-11-24 RX ORDER — LIDOCAINE HYDROCHLORIDE 10 MG/ML
INJECTION, SOLUTION EPIDURAL; INFILTRATION; INTRACAUDAL; PERINEURAL AS NEEDED
Status: DISCONTINUED | OUTPATIENT
Start: 2020-11-24 | End: 2020-11-24 | Stop reason: SURG

## 2020-11-24 RX ORDER — FAMOTIDINE 20 MG/1
20 TABLET ORAL ONCE
Status: COMPLETED | OUTPATIENT
Start: 2020-11-24 | End: 2020-11-24

## 2020-11-24 RX ORDER — CEFAZOLIN SODIUM/WATER 2 G/20 ML
2 SYRINGE (ML) INTRAVENOUS ONCE
Status: COMPLETED | OUTPATIENT
Start: 2020-11-24 | End: 2020-11-24

## 2020-11-24 RX ORDER — MORPHINE SULFATE 4 MG/ML
4 INJECTION, SOLUTION INTRAMUSCULAR; INTRAVENOUS EVERY 4 HOURS PRN
Status: DISCONTINUED | OUTPATIENT
Start: 2020-11-24 | End: 2020-11-28

## 2020-11-24 RX ORDER — HYDROMORPHONE HYDROCHLORIDE 1 MG/ML
0.4 INJECTION, SOLUTION INTRAMUSCULAR; INTRAVENOUS; SUBCUTANEOUS EVERY 5 MIN PRN
Status: DISCONTINUED | OUTPATIENT
Start: 2020-11-24 | End: 2020-11-24 | Stop reason: HOSPADM

## 2020-11-24 RX ORDER — ONDANSETRON 2 MG/ML
4 INJECTION INTRAMUSCULAR; INTRAVENOUS ONCE AS NEEDED
Status: DISCONTINUED | OUTPATIENT
Start: 2020-11-24 | End: 2020-11-24 | Stop reason: HOSPADM

## 2020-11-24 RX ORDER — SODIUM CHLORIDE, SODIUM LACTATE, POTASSIUM CHLORIDE, CALCIUM CHLORIDE 600; 310; 30; 20 MG/100ML; MG/100ML; MG/100ML; MG/100ML
INJECTION, SOLUTION INTRAVENOUS CONTINUOUS
Status: DISCONTINUED | OUTPATIENT
Start: 2020-11-24 | End: 2020-11-24 | Stop reason: HOSPADM

## 2020-11-24 RX ORDER — SENNOSIDES 8.6 MG
17.2 TABLET ORAL NIGHTLY
Status: DISCONTINUED | OUTPATIENT
Start: 2020-11-24 | End: 2020-11-28

## 2020-11-24 RX ORDER — EPHEDRINE SULFATE 50 MG/ML
INJECTION, SOLUTION INTRAVENOUS AS NEEDED
Status: DISCONTINUED | OUTPATIENT
Start: 2020-11-24 | End: 2020-11-24 | Stop reason: SURG

## 2020-11-24 RX ORDER — SODIUM CHLORIDE 0.9 % (FLUSH) 0.9 %
10 SYRINGE (ML) INJECTION AS NEEDED
Status: DISCONTINUED | OUTPATIENT
Start: 2020-11-24 | End: 2020-11-28

## 2020-11-24 RX ORDER — ONDANSETRON 2 MG/ML
4 INJECTION INTRAMUSCULAR; INTRAVENOUS EVERY 6 HOURS PRN
Status: DISCONTINUED | OUTPATIENT
Start: 2020-11-24 | End: 2020-11-28

## 2020-11-24 RX ORDER — ONDANSETRON 2 MG/ML
INJECTION INTRAMUSCULAR; INTRAVENOUS AS NEEDED
Status: DISCONTINUED | OUTPATIENT
Start: 2020-11-24 | End: 2020-11-24 | Stop reason: SURG

## 2020-11-24 RX ORDER — HYDROMORPHONE HYDROCHLORIDE 1 MG/ML
0.2 INJECTION, SOLUTION INTRAMUSCULAR; INTRAVENOUS; SUBCUTANEOUS EVERY 5 MIN PRN
Status: DISCONTINUED | OUTPATIENT
Start: 2020-11-24 | End: 2020-11-24 | Stop reason: HOSPADM

## 2020-11-24 RX ORDER — PROCHLORPERAZINE EDISYLATE 5 MG/ML
5 INJECTION INTRAMUSCULAR; INTRAVENOUS ONCE AS NEEDED
Status: DISCONTINUED | OUTPATIENT
Start: 2020-11-24 | End: 2020-11-24 | Stop reason: HOSPADM

## 2020-11-24 RX ORDER — ACETAMINOPHEN 500 MG
1000 TABLET ORAL ONCE
Status: COMPLETED | OUTPATIENT
Start: 2020-11-24 | End: 2020-11-24

## 2020-11-24 RX ORDER — ONDANSETRON 4 MG/1
4 TABLET, FILM COATED ORAL EVERY 6 HOURS PRN
Status: DISCONTINUED | OUTPATIENT
Start: 2020-11-24 | End: 2020-11-28

## 2020-11-24 RX ORDER — HYDROCODONE BITARTRATE AND ACETAMINOPHEN 5; 325 MG/1; MG/1
2 TABLET ORAL AS NEEDED
Status: DISCONTINUED | OUTPATIENT
Start: 2020-11-24 | End: 2020-11-24 | Stop reason: HOSPADM

## 2020-11-24 RX ORDER — DOCUSATE SODIUM 100 MG/1
100 CAPSULE, LIQUID FILLED ORAL 2 TIMES DAILY
Status: DISCONTINUED | OUTPATIENT
Start: 2020-11-24 | End: 2020-11-28

## 2020-11-24 RX ADMIN — CEFAZOLIN SODIUM/WATER 2 G: 2 G/20 ML SYRINGE (ML) INTRAVENOUS at 08:05:00

## 2020-11-24 RX ADMIN — SODIUM CHLORIDE, SODIUM LACTATE, POTASSIUM CHLORIDE, CALCIUM CHLORIDE: 600; 310; 30; 20 INJECTION, SOLUTION INTRAVENOUS at 13:14:00

## 2020-11-24 RX ADMIN — ROCURONIUM BROMIDE 20 MG: 10 INJECTION, SOLUTION INTRAVENOUS at 11:19:00

## 2020-11-24 RX ADMIN — ROCURONIUM BROMIDE 40 MG: 10 INJECTION, SOLUTION INTRAVENOUS at 07:47:00

## 2020-11-24 RX ADMIN — ROCURONIUM BROMIDE 20 MG: 10 INJECTION, SOLUTION INTRAVENOUS at 10:06:00

## 2020-11-24 RX ADMIN — LIDOCAINE HYDROCHLORIDE 50 MG: 10 INJECTION, SOLUTION EPIDURAL; INFILTRATION; INTRACAUDAL; PERINEURAL at 07:40:00

## 2020-11-24 RX ADMIN — ROCURONIUM BROMIDE 10 MG: 10 INJECTION, SOLUTION INTRAVENOUS at 11:30:00

## 2020-11-24 RX ADMIN — METRONIDAZOLE 500 MG: 500 INJECTION, SOLUTION INTRAVENOUS at 07:40:00

## 2020-11-24 RX ADMIN — EPHEDRINE SULFATE 10 MG: 50 INJECTION, SOLUTION INTRAVENOUS at 08:02:00

## 2020-11-24 RX ADMIN — SODIUM CHLORIDE, SODIUM LACTATE, POTASSIUM CHLORIDE, CALCIUM CHLORIDE: 600; 310; 30; 20 INJECTION, SOLUTION INTRAVENOUS at 07:40:00

## 2020-11-24 RX ADMIN — ROCURONIUM BROMIDE 20 MG: 10 INJECTION, SOLUTION INTRAVENOUS at 10:41:00

## 2020-11-24 RX ADMIN — ONDANSETRON 4 MG: 2 INJECTION INTRAMUSCULAR; INTRAVENOUS at 12:06:00

## 2020-11-24 RX ADMIN — LIDOCAINE HYDROCHLORIDE 50 MG: 10 INJECTION, SOLUTION EPIDURAL; INFILTRATION; INTRACAUDAL; PERINEURAL at 12:14:00

## 2020-11-24 RX ADMIN — DEXAMETHASONE SODIUM PHOSPHATE 4 MG: 4 MG/ML VIAL (ML) INJECTION at 12:06:00

## 2020-11-24 RX ADMIN — MIDAZOLAM HYDROCHLORIDE 2 MG: 1 INJECTION INTRAMUSCULAR; INTRAVENOUS at 07:38:00

## 2020-11-24 RX ADMIN — ROCURONIUM BROMIDE 10 MG: 10 INJECTION, SOLUTION INTRAVENOUS at 07:40:00

## 2020-11-24 RX ADMIN — ROCURONIUM BROMIDE 20 MG: 10 INJECTION, SOLUTION INTRAVENOUS at 09:23:00

## 2020-11-24 RX ADMIN — GLYCOPYRROLATE 0.2 MG: 0.2 INJECTION, SOLUTION INTRAMUSCULAR; INTRAVENOUS at 07:40:00

## 2020-11-24 RX ADMIN — ROCURONIUM BROMIDE 30 MG: 10 INJECTION, SOLUTION INTRAVENOUS at 08:42:00

## 2020-11-24 RX ADMIN — EPHEDRINE SULFATE 20 MG: 50 INJECTION, SOLUTION INTRAVENOUS at 08:45:00

## 2020-11-24 NOTE — RESPIRATORY THERAPY NOTE
MAHESH ASSESSMENT:    Pt does not have a previous diagnosis of MAHESH. Pt does not routinely use a CPAP device at home. This pt is not suspected to be at high risk for MAHESH and sleep lab packet was not provided to patient for outpatient follow-up.     CPAP INITIAT

## 2020-11-24 NOTE — H&P
History & Physical Examination    Patient Name: Jostin Sandoval  MRN: [id number]  CSN: [id number]  YOB: 1960    Diagnosis: Clinically Localized Prostate Cancer (cT1c cN0 Mx)    Present Illness: 64year old male who was diagnosed with clin ABDOMEN [X] [X]    UROGENITAL [X] [X]    EXTREMITIES [X] [X]    OTHER        [ x ] I have discussed the risks and benefits and alternatives with the patient/family. They understand and agree to proceed with plan of care.   [ x ] I have reviewed the Histo

## 2020-11-24 NOTE — PLAN OF CARE
Pt is A&Ox4, VSS on 2 L O2. Complaining of abdominal pain, scheduled IV tylenol and PRN toradol given. IV fluids infusing. Lehman in place. Tolerating clear liquid diet, no n/v. JOAN drain in place, dressing reinforced due to leaking, output monitored.  Up wit and pain management  - Manage/alleviate anxiety  - Utilize distraction and/or relaxation techniques  - Monitor for opioid side effects  - Notify MD/LIP if interventions unsuccessful or patient reports new pain  - Anticipate increased pain with activity and INTERVENTIONS:  - Assess bowel function  - Maintain adequate hydration with IV or PO as ordered and tolerated  - Evaluate effectiveness of GI medications  - Encourage mobilization and activity  - Obtain nutritional consult as needed  - Establish a toiletin

## 2020-11-24 NOTE — PROGRESS NOTES
Therapeutic interchange from Benazepril 40mg to lisinopril 40mg per P&T approved protocol.      Sammy Caal, SharanD

## 2020-11-24 NOTE — ANESTHESIA PREPROCEDURE EVALUATION
Anesthesia PreOp Note    HPI:     Seble Mckinnon is a 64year old male who presents for preoperative consultation requested by: Antionette Chirinos MD    Date of Surgery: 11/24/2020    Procedure(s):  XI ROBOT-ASSISTED LAPAROSCOPIC PROSTATECTOMY/ORICAL hours as needed for Wheezing or Shortness of Breath., Disp: 3 Inhaler, Rfl: 1, More than a month at Unknown time        •  lactated ringers infusion, , Intravenous, Continuous, Mukesh Quiroz MD, Last Rate: 20 mL/hr at 11/24/20 0615    •  metRONIDAZOLE partner violence        Fear of current or ex partner: Not on file        Emotionally abused: Not on file        Physically abused: Not on file        Forced sexual activity: Not on file    Other Topics      Concerns:         Service: Not Asked normal exam   (+) asthma, shortness of breath,   Cardiovascular - normal exam  (+) hypertension,     Neuro/Psych - negative ROS     GI/Hepatic/Renal - negative ROS     Endo/Other - negative ROS   Abdominal   (+) obese,                Anesthesia Plan:   ASA

## 2020-11-24 NOTE — PROGRESS NOTES
[facility]    Progress Note    Melinda Noble Patient Status:  Outpatient in a Bed    5/15/1960 MRN [id number]   Location [address] [facility] Attending Rey Saunders MD   Hosp Day # 0 PCP [facility], PENDING. Essential hypertension  CONT HOME MEDS, MONITOR. MAHESH (obstructive sleep apnea)  MAHESH PROTOCOL, [facility].              Results:     Lab Results   Component Value Date    WBC 7.5 10/22/2020    HGB 14.8 10/22/2020    HCT 44.0 10/22/2020

## 2020-11-24 NOTE — OPERATIVE REPORT
[facility]   Urology Operative Note     Fidelia Mckeon Location: OR   [facility] [id number] MRN [id number]   Admission Date 11/24/2020 Operation Date 11/24/2020   Service Urology Surgeon Yvan Tobias MD      Primary Surgeon: Yvan Tobias tucked and all pressure areas and bony prominences well padded. The patient was positioned to be stable in steep Trendelenburg during surgery without excessive patient movement.  The patient's lower abdomen and genitalia were shaved, prepped and draped in r and below the bladder at the level of the cul-de-sac. The peritoneum was incised horizontally to reveal the vasa, which were isolated for approximately 2 cm proximal to the ampulla and divided with cautery.   This was performed bilaterally with careful diss of the dissection. The outline of the prostate and the bladder neck junction were seen beneath the endopelvic fascia, and the fascia was subsequently incised immediately lateral to the prostate with the underlying levator muscles visualized.   The levator a the posterior space, where the vasa had been isolated to start the surgery.     The vasa and the seminal vesicles were brought through the midline and retracted anteriorly, giving exposure to the lateral edges of perivesical tissue and to the more lateral p apex. The urethra was then fully isolated circumferentially and the prostate apex margin well visualized and confirmed to be completely included on the specimen.  At this point, the remaining attachment of the specimen was at the urethra, which was sharply assistant port fascia was closed using an endoscopic closure device and 0-vicryl suture under direct visualization before removing the other ports.  Hemostasis in the pelvis was excellent on final inspection prior to trocar removal. All trocars were removed floor when patient meets criteria. Pain control. IV fluids. DVT prophylaxis. Clear liquids. AM labs. Keep huitron and JOAN and monitor output. I was present, scrubbed and performed the procedure in its entirety.     Denys Girard MD  11/24/2020 1:08 PM

## 2020-11-24 NOTE — ANESTHESIA POSTPROCEDURE EVALUATION
Patient: Thomas Ivan    Procedure Summary     Date: 11/24/20 Room / Location: [address] [place] [address] MAIN OR    Anesthesia Start: 5352 Anesthesia Stop: 5024    Procedure: XI ROBOT-ASSISTED LAPAROSCOPIC PROSTATECTOMY/ORICAL (N/A Abdomen) Diagnosis:

## 2020-11-24 NOTE — ANESTHESIA PROCEDURE NOTES
Airway  Date/Time: 11/24/2020 7:41 AM  Urgency: Elective      General Information and Staff    Patient location during procedure: OR  Anesthesiologist: Kacie Romero MD  Performed: anesthesiologist     Indications and Patient Condition  Indications for airw

## 2020-11-25 ENCOUNTER — APPOINTMENT (OUTPATIENT)
Dept: CT IMAGING | Facility: HOSPITAL | Age: 60
End: 2020-11-25
Attending: UROLOGY
Payer: COMMERCIAL

## 2020-11-25 PROCEDURE — 99214 OFFICE O/P EST MOD 30 MIN: CPT | Performed by: INTERNAL MEDICINE

## 2020-11-25 PROCEDURE — 74176 CT ABD & PELVIS W/O CONTRAST: CPT | Performed by: UROLOGY

## 2020-11-25 RX ORDER — HYDROCODONE BITARTRATE AND ACETAMINOPHEN 5; 325 MG/1; MG/1
2 TABLET ORAL EVERY 6 HOURS PRN
Status: DISCONTINUED | OUTPATIENT
Start: 2020-11-25 | End: 2020-11-28

## 2020-11-25 RX ORDER — CEFAZOLIN SODIUM/WATER 2 G/20 ML
2 SYRINGE (ML) INTRAVENOUS EVERY 12 HOURS
Status: DISCONTINUED | OUTPATIENT
Start: 2020-11-25 | End: 2020-11-27

## 2020-11-25 RX ORDER — AMLODIPINE BESYLATE 5 MG/1
5 TABLET ORAL DAILY
Status: DISCONTINUED | OUTPATIENT
Start: 2020-11-26 | End: 2020-11-26

## 2020-11-25 RX ORDER — HYDROCODONE BITARTRATE AND ACETAMINOPHEN 5; 325 MG/1; MG/1
1 TABLET ORAL EVERY 6 HOURS PRN
Status: DISCONTINUED | OUTPATIENT
Start: 2020-11-25 | End: 2020-11-28

## 2020-11-25 NOTE — PROGRESS NOTES
Seen and examined. Wife at bedside. Dry CT A/P this AM showed huitron in bladder which was decompressed, no hydronephrosis, and expected postsurgical changes related to prostatectomy. Small amount of ascites and trace extraperitoneal gas and fluid.  Pelvi

## 2020-11-25 NOTE — PLAN OF CARE
Problem: Patient Centered Care  Goal: Patient preferences are identified and integrated in the patient's plan of care  Description: Interventions:  - What would you like us to know as we care for you?  From home with wife  - Provide timely, complete, and pain management  - Manage/alleviate anxiety  - Utilize distraction and/or relaxation techniques  - Monitor for opioid side effects  - Notify MD/LIP if interventions unsuccessful or patient reports new pain  - Anticipate increased pain with activity and pre related to functional status, cognitive ability or social support system  Outcome: Progressing     Problem: GASTROINTESTINAL - ADULT  Goal: Maintains or returns to baseline bowel function  Description: INTERVENTIONS:  - Assess bowel function  - Maintain ad resting in bed with wife at bedside. Patient on 2 liters O2 via nasal cannula, on remote tele, no BM on shift, huitron in place with urine output gradually improving, IV morphine and tylenol for pain, Patient went down for CT which showed no acute findings.

## 2020-11-25 NOTE — PLAN OF CARE
Problem: Patient Centered Care  Goal: Patient preferences are identified and integrated in the patient's plan of care  Description: Interventions:  - What would you like us to know as we care for you?  From home with wife  - Provide timely, complete, and SAFETY ADULT - FALL  Goal: Free from fall injury  Description: INTERVENTIONS:  - Assess pt frequently for physical needs  - Identify cognitive and physical deficits and behaviors that affect risk of falls.   - [place] fall precautions as indicated by asse patient's medication profile  Outcome: Progressing     Problem: METABOLIC/FLUID AND ELECTROLYTES - ADULT  Goal: Electrolytes maintained within normal limits  Description: INTERVENTIONS:  - Monitor labs and rhythm and assess patient for signs and symptoms o

## 2020-11-25 NOTE — PROGRESS NOTES
[facility]  Urology Progress Note    Makayla Cyndi Tobinles Patient Status:  Outpatient in a Bed    5/15/1960 MRN [id number]   Location [facility] 4W/SW/SE Attending Julieta Kumar MD   Hosp Day # 0 PCP Pola Gutiérrez noted.  Psychiatric: Appropriate mood and affect.     Results:     Recent Labs   Lab 11/25/20  0526   WBC 12.5*   HGB 13.3   HCT 40.4   .0     Recent Labs   Lab 11/25/20  0526      K 4.2      CO2 29.0   BUN 23*   CREATSERUM 2.46*   GLU 12

## 2020-11-25 NOTE — PROGRESS NOTES
[facility]    Progress Note    Melinda Noble Patient Status:  Outpatient in a Bed    5/15/1960 MRN [id number]   Location [facility] 4W/SW/SE Attending Zoe Silvestre MD   Hosp Day # 0 PCP Vernon Moses, DO       Subject 5/40) combination tablet ([facility] only)   Oral Daily       Current PRN Inpatient Meds:      Albuterol Sulfate HFA, Normal Saline Flush, ondansetron HCl **OR** Ondansetron HCl, oxyCODONE HCl, morphINE sulfate    Results:     Recent Labs   Lab 11/25/20  0526   R treatment plan and workup    Avtar Nguyen MD

## 2020-11-26 PROCEDURE — 99245 OFF/OP CONSLTJ NEW/EST HI 55: CPT | Performed by: INTERNAL MEDICINE

## 2020-11-26 PROCEDURE — 99214 OFFICE O/P EST MOD 30 MIN: CPT | Performed by: HOSPITALIST

## 2020-11-26 RX ORDER — DEXTROSE, SODIUM CHLORIDE, AND POTASSIUM CHLORIDE 5; .45; .075 G/100ML; G/100ML; G/100ML
INJECTION INTRAVENOUS CONTINUOUS
Status: DISCONTINUED | OUTPATIENT
Start: 2020-11-26 | End: 2020-11-27

## 2020-11-26 NOTE — PLAN OF CARE
Patient is alert and oriented times 4, 2L O2, ambulating with standby assistance and a walker. Fall precautions in place. SCDs and Lovenox for DVT Prophylaxis. Tolerating clear liquid diet. Denies nausea. IVFs and Ancef as ordered.  L-JOAN drain - leaking at goal  Description: INTERVENTIONS:  - Encourage pt to monitor pain and request assistance  - Assess pain using appropriate pain scale  - Administer analgesics based on type and severity of pain and evaluate response  - Implement non-pharmacological measures Department for coordinating discharge planning if the patient needs post-hospital services based on physician/LIP order or complex needs related to functional status, cognitive ability or social support system  Outcome: Progressing     Problem: Lopez Whitfield

## 2020-11-26 NOTE — PROGRESS NOTES
[facility]    Progress Note    Devyn Laura Patient Status:  Outpatient in a Bed    5/15/1960 MRN [id number]   Location [facility] 4W/SW/SE Attending Ashley Schmitz MD   [facility] Day # 0 PCP DO Shalini Bridges Finalized by (CST): Josemanuel Chan MD on 11/25/2020 at 9:09 AM                          Assessment and Plan:     Pt is a 64year old male who is POD 2 s/p RALP. Low urine output, CAMILLE. CT without definite evidence of a urine leak.  Lehman in correct position,

## 2020-11-26 NOTE — PROGRESS NOTES
[facility]    Progress Note    Messivalentino Sauceda Patient Status:  Outpatient in a Bed    5/15/1960 MRN [id number]   Location [facility] 4W/SW/SE Attending Geo Tam MD     Hosp Day # 0 PCP DO Zach Styles Current PRN Inpatient Meds:      HYDROcodone-acetaminophen **OR** HYDROcodone-acetaminophen, Albuterol Sulfate HFA, Normal Saline Flush, ondansetron HCl **OR** Ondansetron HCl, morphINE sulfate    Results:     Recent Labs   Lab 11/25/20  0526 11/26/2 prerenal  - Cont IVF, optimize bp as above  - Hold ACEi, monitor vitals  - Avoiding Nephrotoxic agents  - Cont to monitor      MAHESH (obstructive sleep apnea)    DVT Prophylaxis--> lovenox    Dispo--> pending  Greater than 35 minutes spent, >50% spent counse

## 2020-11-27 PROCEDURE — 99213 OFFICE O/P EST LOW 20 MIN: CPT | Performed by: INTERNAL MEDICINE

## 2020-11-27 PROCEDURE — 99214 OFFICE O/P EST MOD 30 MIN: CPT | Performed by: HOSPITALIST

## 2020-11-27 RX ORDER — SENNA AND DOCUSATE SODIUM 50; 8.6 MG/1; MG/1
2 TABLET, FILM COATED ORAL NIGHTLY PRN
Qty: 14 TABLET | Refills: 0 | Status: SHIPPED | OUTPATIENT
Start: 2020-11-27 | End: 2020-12-02

## 2020-11-27 RX ORDER — CEPHALEXIN 500 MG/1
500 CAPSULE ORAL 2 TIMES DAILY
Qty: 20 CAPSULE | Refills: 0 | Status: SHIPPED | OUTPATIENT
Start: 2020-11-27 | End: 2020-12-04

## 2020-11-27 RX ORDER — HYDROCODONE BITARTRATE AND ACETAMINOPHEN 5; 325 MG/1; MG/1
1-2 TABLET ORAL EVERY 6 HOURS PRN
Qty: 30 TABLET | Refills: 0 | Status: SHIPPED | OUTPATIENT
Start: 2020-11-27 | End: 2021-02-01

## 2020-11-27 NOTE — PLAN OF CARE
Patient is alert and oriented times 4, on 2L NC overnight, dyspnea on exertion, ambulating with standby assistance and a walker. Calling appropriately. Fall precautions in place. SCDs and Lovenox for DVT Prophylaxis. PRN Norco and Morphine for pain.  Lehman falls.  - [place] fall precautions as indicated by assessment.  - Educate pt/family on patient safety including physical limitations  - Instruct pt to call for assistance with activity based on assessment  - Modify environment to reduce risk of injury  - and rhythm and assess patient for signs and symptoms of electrolyte imbalances  - Administer electrolyte replacement as ordered  - Monitor response to electrolyte replacements, including rhythm and repeat lab results as appropriate  - Fluid restriction as

## 2020-11-27 NOTE — HOME CARE LIAISON
Received referral from [address] Unable to take pt due to staffing shortage. Pt re-referred to [address] home health services. MADAY Posey notified.

## 2020-11-27 NOTE — PROGRESS NOTES
[facility]  Urology Consult Follow-Up Note    Sylvester Solorzano Patient Status:  Outpatient in a Bed    5/15/1960 MRN [id number]   Location [facility] 4W/SW/SE Attending Reji Bhatt MD   Hosp Day # 0 PCP V 11/25/2020  CONCLUSION:  1. No renal obstruction, or significant hematoma to account for the diminished urine output. 2. Expected postsurgical changes related to prostatectomy. Small amount of ascites and trace extraperitoneal gas and fluid.   Pelvic drain

## 2020-11-27 NOTE — PLAN OF CARE
Plan of care reviewed with patient and his wife. Patient ambulating in the hallway x2 with walker and assistance. IVF changed and fluids running at 125 ml/hr. JOAN drain dressing changed due to leaking around site.  Leaking around huitron catheter noted with ac

## 2020-11-27 NOTE — CM/SW NOTE
Received MDO for home health RN- JOAN drain & huitron. Attempted to meet with patient but asleep. Spoke w/ wife Abelino Casanova, discussed home health options. She is agreeable. Referral initiated via Donald to check staffing & insurance options, F2F complete.     Patient

## 2020-11-27 NOTE — PROGRESS NOTES
[facility]    Progress Note    Junnie Kin Patient Status:  Outpatient in a Bed    5/15/1960 MRN [id number]   Location [facility] 4W/SW/SE Attending Avinash Poe MD     Hosp Day # 0 PCP DO Giuliana De Anda **OR** HYDROcodone-acetaminophen, Albuterol Sulfate HFA, Normal Saline Flush, ondansetron HCl **OR** Ondansetron HCl, morphINE sulfate    Results:     Recent Labs   Lab 11/25/20  0526 11/26/20  0623 11/27/20  0605   RBC 4.44 4.28* 4.00*   HGB 13.3 12.7* 11 will restart soon, bp stable without meds    CAMILLE   - Baseline Cr normal -0.9  -now 2.46 --> 1.73--> 1.01  - Possibly 2/2 prerenal  - Cont IVF, optimize bp as above  - Hold ACEi monitor vitals  - Avoiding Nephrotoxic agents  - Cont to monitor      MAHESH (obst

## 2020-11-27 NOTE — CONSULTS
[facility]    PATIENT'S NAME: Karl Power   ATTENDING PHYSICIAN: Reina Steele MD   CONSULTING PHYSICIAN: Brijesh Hernández.  Tomás Khan MD   PATIENT ACCOUNT#:   [id number]    LOCATION:  4WS[address] [place] [address] #:   [id number]       DATE OF drains and the catheter in his penis. Feels a little full in his belly and puffy. Denies chest pain, shortness of breath, weakness, any problems with his bowels or any neurological complaints. All other 10-point review of systems is negative.       MEDIC

## 2020-11-28 VITALS
SYSTOLIC BLOOD PRESSURE: 142 MMHG | DIASTOLIC BLOOD PRESSURE: 95 MMHG | BODY MASS INDEX: 38.8 KG/M2 | HEART RATE: 86 BPM | OXYGEN SATURATION: 95 % | RESPIRATION RATE: 18 BRPM | HEIGHT: 69 IN | WEIGHT: 262 LBS | TEMPERATURE: 99 F

## 2020-11-28 PROCEDURE — 99214 OFFICE O/P EST MOD 30 MIN: CPT | Performed by: HOSPITALIST

## 2020-11-28 RX ORDER — AMLODIPINE BESYLATE 5 MG/1
5 TABLET ORAL DAILY
Qty: 30 TABLET | Refills: 0 | Status: SHIPPED | OUTPATIENT
Start: 2020-11-28 | End: 2021-01-12

## 2020-11-28 RX ORDER — AMLODIPINE BESYLATE 5 MG/1
5 TABLET ORAL ONCE
Status: COMPLETED | OUTPATIENT
Start: 2020-11-28 | End: 2020-11-28

## 2020-11-28 NOTE — PROGRESS NOTES
[facility]    Progress Note    Melinda Pang Patient Status:  Outpatient in a Bed    5/15/1960 MRN [id number]   Location [facility] 4W/SW/SE Attending Radha Noonan MD   Hosp Day # 0 PCP Pito Pham DO       Subjective present, no abnormal bruising noted  Back/Spine: no abnormalities noted  Musculoskeletal: full ROM all extremities good strength  no deformities  Extremities: Right leg 2+ edema left leg 1+  Neurological:  Grossly normal    Results:     Laboratory Data:  L

## 2020-11-28 NOTE — PLAN OF CARE
Problem: Patient Centered Care  Goal: Patient preferences are identified and integrated in the patient's plan of care  Description: Interventions:  - What would you like us to know as we care for you?  From home with wife  - Provide timely, complete, and SAFETY ADULT - FALL  Goal: Free from fall injury  Description: INTERVENTIONS:  - Assess pt frequently for physical needs  - Identify cognitive and physical deficits and behaviors that affect risk of falls.   - [place] fall precautions as indicated by asse patient's medication profile  Outcome: Progressing     Problem: METABOLIC/FLUID AND ELECTROLYTES - ADULT  Goal: Electrolytes maintained within normal limits  Description: INTERVENTIONS:  - Monitor labs and rhythm and assess patient for signs and symptoms o

## 2020-11-28 NOTE — CM/SW NOTE
MADAY was notified by RN/freddie Alcaraz will dc home today. Per freddie garcia accepted and reserved with [facility]. MADAY attempted to call [facility] ([phone]) [phone], unable to get through.  Phone rings, then eventually busy dial tone with no abili

## 2020-11-28 NOTE — PROGRESS NOTES
[facility]    Progress Note    Marlo Donaldson Patient Status:  Outpatient in a Bed    5/15/1960 MRN [id number]   [facility] 4W/SW/SE Attending Ayesha Clayton MD   [facility] Day # 0 PCP DO Beto Anna agreeable to plan.          Naima Johnson MD, [facility]  Urologist  [place] [address]  Office: [phone]

## 2020-11-29 DIAGNOSIS — L74.0 HEAT RASH: ICD-10-CM

## 2020-11-29 DIAGNOSIS — L30.9 DERMATITIS: ICD-10-CM

## 2020-11-29 NOTE — PROGRESS NOTES
Patient alert and oriented X4, vitals stable. Norco for pain control. Tolerating diet. Denies nausea and vomiting. Patient stated he felt much better after having a bowel movement. Lehman in place and draining appropriately.  JOAN drain with minimal output but

## 2020-11-30 ENCOUNTER — TELEPHONE (OUTPATIENT)
Dept: SURGERY | Facility: CLINIC | Age: 60
End: 2020-11-30

## 2020-11-30 NOTE — TELEPHONE ENCOUNTER
Spouse asking if pt should keep appt for 12/2 but he needs to have x ray done as well - should appt be postponed until after x ray is done ?

## 2020-11-30 NOTE — TELEPHONE ENCOUNTER
S/w spouse, Heather Jonathan, re: her message as stated below. She reports scheduling Cystogram/ X3 views for 12/1/20 @ 1 pm.  Pt is able to keep 12/2/20 appt as planned.

## 2020-12-01 ENCOUNTER — TELEPHONE (OUTPATIENT)
Dept: SURGERY | Facility: CLINIC | Age: 60
End: 2020-12-01

## 2020-12-01 NOTE — TELEPHONE ENCOUNTER
I s/w ELOISA who called earlier to give some instructions for changes in his schd and he also gave verbal orders to have this pt keep his appt tomorrow with him for post op but that he should not have the cystogram tomorrow because it is too soon after surgery

## 2020-12-01 NOTE — TELEPHONE ENCOUNTER
S/W pt 's spouse and informed her that I just s/w ELOISA and he is insisting that he wants to see pt tomorrow as schd for his post-op but that he wants him to have the Cystogram tomorrow, not tues.

## 2020-12-02 ENCOUNTER — OFFICE VISIT (OUTPATIENT)
Dept: SURGERY | Facility: CLINIC | Age: 60
End: 2020-12-02
Payer: COMMERCIAL

## 2020-12-02 VITALS
HEART RATE: 80 BPM | BODY MASS INDEX: 38.95 KG/M2 | HEIGHT: 69 IN | DIASTOLIC BLOOD PRESSURE: 91 MMHG | WEIGHT: 263 LBS | SYSTOLIC BLOOD PRESSURE: 128 MMHG

## 2020-12-02 DIAGNOSIS — C61 PROSTATE CANCER (HCC): Primary | ICD-10-CM

## 2020-12-02 PROCEDURE — 3008F BODY MASS INDEX DOCD: CPT | Performed by: UROLOGY

## 2020-12-02 PROCEDURE — 3074F SYST BP LT 130 MM HG: CPT | Performed by: UROLOGY

## 2020-12-02 PROCEDURE — 3080F DIAST BP >= 90 MM HG: CPT | Performed by: UROLOGY

## 2020-12-02 NOTE — PROGRESS NOTES
[facility] Urology  Follow-Up Visit    HPI: Julian Colon is a 64year old male presents for a follow up visit. Patient was last seen on 10/9/2020. Accompanied by his wife. INTERVAL HISTORY: s/p RALP 11/24/20.  Postop course complicated by AK complicated by CAMILLE and oliguria, thought to be of prerenal/renal source. Possible minor component of vesicourethral anastomotic leak. CAMILLE and oliguria resolved. Discharged home on POD 4 with Lehman and JOAN drain.     Final pathology revealed GG 3+3=6 PCa,           Case: [id number]  Provider: Lady Geoff MD       Collected: 11/24/2020    A. Prostatic fat; radical prostatectomy:  · Benign fibroadipose and vascular tissue, no evidence of carcinoma     B.  Prostate; radical prostatectomy:  · Prostatic fernando report. Discussed the significance of  adverse findings when/if identified on final pathology. Will await post prostatectomy PSA levels in 6 weeks to guide need for further treatment.      Discussed prostate cancer surveillance following definitive local tr

## 2020-12-04 ENCOUNTER — HOSPITAL ENCOUNTER (OUTPATIENT)
Dept: GENERAL RADIOLOGY | Facility: HOSPITAL | Age: 60
Discharge: HOME | End: 2020-12-04
Attending: UROLOGY
Payer: COMMERCIAL

## 2020-12-04 ENCOUNTER — TELEPHONE (OUTPATIENT)
Dept: SURGERY | Facility: CLINIC | Age: 60
End: 2020-12-04

## 2020-12-04 DIAGNOSIS — C61 PROSTATE CANCER (HCC): ICD-10-CM

## 2020-12-04 PROCEDURE — 51600 INJECTION FOR BLADDER X-RAY: CPT | Performed by: UROLOGY

## 2020-12-04 PROCEDURE — 74430 CONTRAST X-RAY BLADDER: CPT | Performed by: UROLOGY

## 2020-12-04 NOTE — TELEPHONE ENCOUNTER
Dr. Dusty Parker calling to report results of cystogram, findings of bladder leak at base of bladder. Dr. Dusty Parker wants to make sure Dr. Joao Layne sees report before patient's office visit today. Dr. Joao Layne, [place].  Dr. Dusty Parker is not recommending huitron removal.

## 2020-12-04 NOTE — PROGRESS NOTES
Called patient and spoke to him over the phone. Discussed cystogram results from today 12/4/20 which revealed an anastomotic leak.  Discussed that conservative management with prolonged drainage is the initial choice of management with excellent success rat

## 2020-12-04 NOTE — TELEPHONE ENCOUNTER
pts wife states that the dr called him to let him know not to come in today for post op appt. , and that he wants to see the pt. on 12/14/2020, which is sooner then 1st avail. , Wife also wants to know if pt. Will get a Cysto proc done on 12/14/2020?  Wife ne

## 2020-12-07 ENCOUNTER — HOSPITAL ENCOUNTER (EMERGENCY)
Facility: HOSPITAL | Age: 60
Discharge: HOME | End: 2020-12-07
Attending: EMERGENCY MEDICINE
Payer: COMMERCIAL

## 2020-12-07 VITALS
SYSTOLIC BLOOD PRESSURE: 131 MMHG | OXYGEN SATURATION: 97 % | RESPIRATION RATE: 16 BRPM | TEMPERATURE: 99 F | HEART RATE: 66 BPM | DIASTOLIC BLOOD PRESSURE: 100 MMHG

## 2020-12-07 DIAGNOSIS — T85.698A BROKEN JACKSON-PRATT DRAIN, INITIAL ENCOUNTER: Primary | ICD-10-CM

## 2020-12-07 PROCEDURE — 81001 URINALYSIS AUTO W/SCOPE: CPT | Performed by: EMERGENCY MEDICINE

## 2020-12-07 PROCEDURE — 96360 HYDRATION IV INFUSION INIT: CPT

## 2020-12-07 PROCEDURE — 99284 EMERGENCY DEPT VISIT MOD MDM: CPT

## 2020-12-07 PROCEDURE — 85025 COMPLETE CBC W/AUTO DIFF WBC: CPT | Performed by: EMERGENCY MEDICINE

## 2020-12-07 PROCEDURE — 87077 CULTURE AEROBIC IDENTIFY: CPT | Performed by: EMERGENCY MEDICINE

## 2020-12-07 PROCEDURE — 87086 URINE CULTURE/COLONY COUNT: CPT | Performed by: EMERGENCY MEDICINE

## 2020-12-07 PROCEDURE — 87186 SC STD MICRODIL/AGAR DIL: CPT | Performed by: EMERGENCY MEDICINE

## 2020-12-07 PROCEDURE — 80048 BASIC METABOLIC PNL TOTAL CA: CPT | Performed by: EMERGENCY MEDICINE

## 2020-12-07 RX ORDER — SODIUM CHLORIDE 9 MG/ML
INJECTION, SOLUTION INTRAVENOUS CONTINUOUS
Status: DISCONTINUED | OUTPATIENT
Start: 2020-12-07 | End: 2020-12-07

## 2020-12-07 NOTE — TELEPHONE ENCOUNTER
Please advise on message below.      Future Appointments   Date Time Provider Marty Interiano   12/14/2020  8:00 AM [facility] XR RM2 FLUORO [facility] DIAG RAD EM [facility]

## 2020-12-08 NOTE — ED PROVIDER NOTES
Patient Seen in: [facility] Emergency Department    History   Patient presents with:  Cath Tube Problem    Stated Complaint: drain problem    HPI    Patient complains of JOAN drain not working. States it just immediately inflates with air.   He had Aero Soln,  Inhale 2 puffs into the lungs every 4 (four) hours as needed for Wheezing or Shortness of Breath.        Family History   Problem Relation Age of Onset   • Diabetes Mother    • Hypertension Mother    • Hypertension Father    • Prostate Cancer Fa All other components within normal limits   CBC W/ DIFFERENTIAL - Abnormal; Notable for the following components:    WBC 13.2 (*)     Neutrophil Absolute Prelim 9.21 (*)     Neutrophil Absolute 9.21 (*)     All other components within normal limits   BASIC

## 2020-12-08 NOTE — ED NOTES
Still scant urine output, pt requesting water, given to pt per MD Pamela Ryder approval. See [facility] for IVF orders.

## 2020-12-08 NOTE — ED NOTES
Pt a/ox4, respirations unlabored, speech full/clear, gait steady, no acute distress. All ED orders completed. Pt instructed to return to ED for any new/severe s/s or as directed by provider, and to see PMD/specialist ASAP or as directed by provider.

## 2020-12-08 NOTE — TELEPHONE ENCOUNTER
Patient should have a cystogram study on 12/14/20. Based on the results of the study, we will call him and discuss next appointment / next steps with him.

## 2020-12-14 ENCOUNTER — HOSPITAL ENCOUNTER (OUTPATIENT)
Dept: GENERAL RADIOLOGY | Facility: HOSPITAL | Age: 60
Discharge: HOME | End: 2020-12-14
Attending: UROLOGY
Payer: COMMERCIAL

## 2020-12-14 DIAGNOSIS — Z90.79 STATUS POST PROSTATECTOMY: ICD-10-CM

## 2020-12-14 DIAGNOSIS — C61 PROSTATE CANCER (HCC): ICD-10-CM

## 2020-12-14 PROCEDURE — 74430 CONTRAST X-RAY BLADDER: CPT | Performed by: UROLOGY

## 2020-12-14 PROCEDURE — 51600 INJECTION FOR BLADDER X-RAY: CPT | Performed by: UROLOGY

## 2020-12-14 RX ORDER — SULFAMETHOXAZOLE AND TRIMETHOPRIM 800; 160 MG/1; MG/1
1 TABLET ORAL 2 TIMES DAILY
Qty: 4 TABLET | Refills: 0 | Status: SHIPPED | OUTPATIENT
Start: 2020-12-14 | End: 2020-12-16

## 2020-12-15 ENCOUNTER — OFFICE VISIT (OUTPATIENT)
Dept: SURGERY | Facility: CLINIC | Age: 60
End: 2020-12-15
Payer: COMMERCIAL

## 2020-12-15 ENCOUNTER — TELEPHONE (OUTPATIENT)
Dept: SURGERY | Facility: CLINIC | Age: 60
End: 2020-12-15

## 2020-12-15 VITALS
DIASTOLIC BLOOD PRESSURE: 91 MMHG | SYSTOLIC BLOOD PRESSURE: 131 MMHG | HEART RATE: 79 BPM | BODY MASS INDEX: 38 KG/M2 | WEIGHT: 258 LBS

## 2020-12-15 DIAGNOSIS — C61 PROSTATE CANCER (HCC): Primary | ICD-10-CM

## 2020-12-15 PROCEDURE — 3075F SYST BP GE 130 - 139MM HG: CPT | Performed by: UROLOGY

## 2020-12-15 PROCEDURE — 3080F DIAST BP >= 90 MM HG: CPT | Performed by: UROLOGY

## 2020-12-15 NOTE — PROGRESS NOTES
[facility] Urology  Follow-Up Visit    HPI: Jean-Paul Russell is a 64year old male presents for a follow up visit. Patient was last seen on 12/2/2020. Here by himself. INTERVAL HISTORY: s/p RALP 11/24/20.  Postop course complicated by CAMILLE and ol on POD 4 with Lehman and JOAN drain. 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 Negative surgical margin. Final stage pT2 Nx.    - 12/2/20 F/U: doing well. Minimal JOAN output.  Good UOP throug affect. PATHOLOGY:    Pathology                                 Case: [id number]  Provider: Lady Geoff MD       Collected: 11/24/2020    A.  Prostatic fat; radical prostatectomy:  · Benign fibroadipose and vascular tissue, no evidence of carcin cancer diagnosed 9/2020. SP RALP 11/24/20. Final pathology grade Group 1, no adverse pathologic findings, negative margins (pT2 Nx). Postop CAMILLE likely due to prerenal/renal causes which has resolved.      Post-op vesicourethral anastomotic leak managed

## 2020-12-18 NOTE — DISCHARGE SUMMARY
[facility] HOSPITALIST  DISCHARGE SUMMARY     Melinda Noble Patient Status:  Outpatient in a Bed    5/15/1960 MRN [id number]   Location [facility] 4W/SW/SE Attending No att. providers found   Hosp Day # 0 PCP Vernon Moses DO     Date of Adm monitor  -seen by nephrology      MAHESH (obstructive sleep apnea)     DVT Prophylaxis--> lovenox     Dispo--> home  with huitron and taya drain    •      Discharge Medication List:     Discharge Medications      START taking these medications      Instructions P Musculoskeletal: Moves all extremities. Extremities: No edema.   -----------------------------------------------------------------------------------------------  PATIENT DISCHARGE INSTRUCTIONS: See electronic chart      Hospital Discharge Diagnoses: s/p

## 2020-12-26 ENCOUNTER — PATIENT MESSAGE (OUTPATIENT)
Dept: SURGERY | Facility: CLINIC | Age: 60
End: 2020-12-26

## 2020-12-30 ENCOUNTER — TELEPHONE (OUTPATIENT)
Dept: SURGERY | Facility: CLINIC | Age: 60
End: 2020-12-30

## 2020-12-30 DIAGNOSIS — R39.89 SENSATION OF PRESSURE IN BLADDER AREA: Primary | ICD-10-CM

## 2020-12-30 NOTE — TELEPHONE ENCOUNTER
Good Day: I have been having some mild pain in my bladder . It almost feels like a bladder infection.   Can I get an appointment to see if this is the case

## 2020-12-30 NOTE — TELEPHONE ENCOUNTER
Patient c/o mild bladder pressure sensation, comes and goes; onset 2 weeks ago. Along with intermittent dysuria. Patient states his wife had him do a \"Azo\" test and it showed \"positive\". Patient denies hematuria, fever, chills.       I advised nancy

## 2020-12-30 NOTE — TELEPHONE ENCOUNTER
LMTCB reminding pt to notify call center to report pain or bleeding. In addition, I called & s/w Marilynn Handley (s/o [phone]) in attempts to locate pt. She admitted to writing the [employer] message. She will try to contact pt & have him return my call.

## 2020-12-31 ENCOUNTER — LAB ENCOUNTER (OUTPATIENT)
Dept: LAB | Age: 60
End: 2020-12-31
Attending: UROLOGY
Payer: COMMERCIAL

## 2020-12-31 DIAGNOSIS — R39.89 SENSATION OF PRESSURE IN BLADDER AREA: ICD-10-CM

## 2020-12-31 LAB
BACTERIA UR QL AUTO: NEGATIVE /HPF
BILIRUB UR QL: NEGATIVE
COLOR UR: YELLOW
GLUCOSE UR-MCNC: NEGATIVE MG/DL
KETONES UR-MCNC: NEGATIVE MG/DL
NITRITE UR QL STRIP.AUTO: NEGATIVE
PH UR: 5 [PH] (ref 5–8)
PROT UR-MCNC: 30 MG/DL
RBC #/AREA URNS AUTO: 2 /HPF
SP GR UR STRIP: 1.02 (ref 1–1.03)
UROBILINOGEN UR STRIP-ACNC: <2
WBC #/AREA URNS AUTO: 28 /HPF

## 2020-12-31 PROCEDURE — 87086 URINE CULTURE/COLONY COUNT: CPT

## 2020-12-31 PROCEDURE — 81001 URINALYSIS AUTO W/SCOPE: CPT

## 2020-12-31 RX ORDER — SULFAMETHOXAZOLE AND TRIMETHOPRIM 800; 160 MG/1; MG/1
1 TABLET ORAL 2 TIMES DAILY
Qty: 14 TABLET | Refills: 0 | Status: SHIPPED | OUTPATIENT
Start: 2020-12-31 | End: 2021-01-07

## 2020-12-31 NOTE — TELEPHONE ENCOUNTER
Patient NEEDS to submit urine sample as I do not see this has been done yet. I will send Bactrim DS 1 tab by mouth twice a day for 10 days which he should start AFTER submitting urine.

## 2020-12-31 NOTE — TELEPHONE ENCOUNTER
S/W pt and informed him of [facility]'s msg and orders as stated below and pt informed that he just submitted the urine at the lab. He verbalized understanding and compliance.

## 2021-01-04 NOTE — TELEPHONE ENCOUNTER
Urine culture no growth. UA minimally abnormal.  I called and spoke with patient. He states symptoms are significant improved. He denies any difficulty with urination and states he is urinating large amounts.    I recommended he contact the office if sym

## 2021-01-12 ENCOUNTER — TELEPHONE (OUTPATIENT)
Dept: FAMILY MEDICINE CLINIC | Facility: CLINIC | Age: 61
End: 2021-01-12

## 2021-01-12 RX ORDER — AMLODIPINE BESYLATE 5 MG/1
5 TABLET ORAL DAILY
Qty: 30 TABLET | Refills: 0 | Status: SHIPPED | OUTPATIENT
Start: 2021-01-12 | End: 2021-02-01

## 2021-01-12 NOTE — TELEPHONE ENCOUNTER
Mikael Aguirre, patient states he had surgery in November. Amlodipine-Benazepril was changed to Amlodipine during the hospitalization. Patient is not sure if he should continue on just Amlodipine. Patient will need a refill. Please advise.

## 2021-01-24 ENCOUNTER — LAB ENCOUNTER (OUTPATIENT)
Dept: LAB | Facility: HOSPITAL | Age: 61
End: 2021-01-24
Attending: UROLOGY
Payer: COMMERCIAL

## 2021-01-24 DIAGNOSIS — C61 PROSTATE CANCER (HCC): ICD-10-CM

## 2021-01-24 LAB — PSA SERPL-MCNC: <0.01 NG/ML (ref ?–4)

## 2021-01-24 PROCEDURE — 84153 ASSAY OF PSA TOTAL: CPT

## 2021-01-24 PROCEDURE — 36415 COLL VENOUS BLD VENIPUNCTURE: CPT

## 2021-01-28 ENCOUNTER — OFFICE VISIT (OUTPATIENT)
Dept: SURGERY | Facility: CLINIC | Age: 61
End: 2021-01-28
Payer: COMMERCIAL

## 2021-01-28 VITALS — SYSTOLIC BLOOD PRESSURE: 127 MMHG | DIASTOLIC BLOOD PRESSURE: 89 MMHG | HEART RATE: 84 BPM

## 2021-01-28 DIAGNOSIS — C61 PROSTATE CANCER (HCC): Primary | ICD-10-CM

## 2021-01-28 DIAGNOSIS — N39.3 URINARY INCONTINENCE, STRESS, MALE: ICD-10-CM

## 2021-01-28 PROCEDURE — 3079F DIAST BP 80-89 MM HG: CPT | Performed by: UROLOGY

## 2021-01-28 PROCEDURE — 3074F SYST BP LT 130 MM HG: CPT | Performed by: UROLOGY

## 2021-01-28 NOTE — PROGRESS NOTES
[facility] Urology  Follow-Up Visit    HPI: Joann Sauceda is a 64year old male presents for a follow up visit. Patient was last seen on 12/15/2020. Here by himself. INTERVAL HISTORY: s/p RALP 11/24/20.  Postop course complicated by CAMILLE and o CAMILLE and oliguria, thought to be of prerenal/renal source. Possible minor component of vesicourethral anastomotic leak. CAMILLE and oliguria resolved. Discharged home on POD 4 with Lehman and JOAN drain.     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 PATHOLOGY:    Pathology                                 Case: [id number]  Provider: Rodney Camargo MD       Collected: 11/24/2020    A.  Prostatic fat; radical prostatectomy:  · Benign fibroadipose and vascular tissue, no ev vesicourethral anastomotic stricture based on voiding pattern and PVR. Instructed on importance of performing Kegel exercises daily. Offered referral for PF PT. Patient wants to hold off as he is noticing continuous gradual improvement.      All questio

## 2021-02-01 ENCOUNTER — OFFICE VISIT (OUTPATIENT)
Dept: FAMILY MEDICINE CLINIC | Facility: CLINIC | Age: 61
End: 2021-02-01
Payer: COMMERCIAL

## 2021-02-01 VITALS
BODY MASS INDEX: 38.83 KG/M2 | TEMPERATURE: 98 F | HEIGHT: 69 IN | DIASTOLIC BLOOD PRESSURE: 98 MMHG | HEART RATE: 84 BPM | WEIGHT: 262.19 LBS | SYSTOLIC BLOOD PRESSURE: 120 MMHG

## 2021-02-01 DIAGNOSIS — G47.33 OSA (OBSTRUCTIVE SLEEP APNEA): ICD-10-CM

## 2021-02-01 DIAGNOSIS — Z23 NEED FOR VACCINATION: ICD-10-CM

## 2021-02-01 DIAGNOSIS — R60.0 BILATERAL LEG EDEMA: ICD-10-CM

## 2021-02-01 DIAGNOSIS — C61 PROSTATE CANCER (HCC): ICD-10-CM

## 2021-02-01 DIAGNOSIS — I10 ESSENTIAL HYPERTENSION: Primary | ICD-10-CM

## 2021-02-01 PROCEDURE — 90750 HZV VACC RECOMBINANT IM: CPT | Performed by: FAMILY MEDICINE

## 2021-02-01 PROCEDURE — 3080F DIAST BP >= 90 MM HG: CPT | Performed by: FAMILY MEDICINE

## 2021-02-01 PROCEDURE — 3074F SYST BP LT 130 MM HG: CPT | Performed by: FAMILY MEDICINE

## 2021-02-01 PROCEDURE — 3008F BODY MASS INDEX DOCD: CPT | Performed by: FAMILY MEDICINE

## 2021-02-01 PROCEDURE — 99214 OFFICE O/P EST MOD 30 MIN: CPT | Performed by: FAMILY MEDICINE

## 2021-02-01 PROCEDURE — 90471 IMMUNIZATION ADMIN: CPT | Performed by: FAMILY MEDICINE

## 2021-02-01 RX ORDER — AMLODIPINE BESYLATE AND BENAZEPRIL HYDROCHLORIDE 5; 40 MG/1; MG/1
1 CAPSULE ORAL DAILY
Qty: 90 CAPSULE | Refills: 1 | Status: SHIPPED | OUTPATIENT
Start: 2021-02-01 | End: 2021-03-02

## 2021-02-01 NOTE — PROGRESS NOTES
Patient ID: Mariposa Hill is a 64year old male. HPI  Patient presents with:  Prostate Cancer: f/u  Hypertension: f/u    Last seen by me on 10/27/2020. Hx of prostate cancer. SHx of assisted laparoscopic prostatectomy on 11/24/2020.  Pt saw  2008   • Pneumonia due to organism    • Prostate cancer ([facility] [address] 09/01/2020    Shamar 3+3=6 1/12 cores, 10% involvement.     • Shortness of breath    • Visual impairment     reading glasses       Past Surgical History:   Procedure Laterality Date   • BIOPSY O Conjunctivae and EOM are normal.   Cardiovascular: Normal rate, regular rhythm and normal heart sounds. Pulmonary/Chest: Effort normal and breath sounds normal. No respiratory distress.    Lower legs: trace pretibial edema  Neurological: Patient is alert tolerating prior to hospitalization and I do your BMP in about 2 weeks. No need to fast for that lab test.  We will see you back in about 2 months and we will see if  blood pressure is better. Start taking a multivitamin such as Centrum Silver.   Gertrudis Moreno

## 2021-02-01 NOTE — PATIENT INSTRUCTIONS
Stop the amlodipine as your blood pressure is not controlled. Lets go back to the amlodipine/benazepril drug that you are tolerating prior to hospitalization and I do your BMP in about 2 weeks.   No need to fast for that lab test.  We will see you back in

## 2021-02-07 RX ORDER — AMLODIPINE BESYLATE 5 MG/1
TABLET ORAL
Qty: 30 TABLET | Refills: 0 | OUTPATIENT
Start: 2021-02-07

## 2021-03-02 ENCOUNTER — TELEPHONE (OUTPATIENT)
Dept: FAMILY MEDICINE CLINIC | Facility: CLINIC | Age: 61
End: 2021-03-02

## 2021-03-02 DIAGNOSIS — I10 ESSENTIAL HYPERTENSION: ICD-10-CM

## 2021-03-02 RX ORDER — AMLODIPINE BESYLATE AND BENAZEPRIL HYDROCHLORIDE 5; 40 MG/1; MG/1
1 CAPSULE ORAL DAILY
Qty: 90 CAPSULE | Refills: 1 | Status: SHIPPED | OUTPATIENT
Start: 2021-03-02 | End: 2021-08-28

## 2021-03-02 NOTE — TELEPHONE ENCOUNTER
Medication was originally sent to local pharmacy. Medication sent to [facility] pharmacy.

## 2021-03-02 NOTE — TELEPHONE ENCOUNTER
•  amLODIPine Besy-Benazepril HCl 5-40 MG Oral Cap, Take 1 capsule by mouth daily. , Disp: 90 capsule, Rfl: 1

## 2021-03-20 DIAGNOSIS — Z23 NEED FOR VACCINATION: ICD-10-CM

## 2021-03-27 ENCOUNTER — LAB ENCOUNTER (OUTPATIENT)
Dept: LAB | Age: 61
End: 2021-03-27
Attending: FAMILY MEDICINE
Payer: COMMERCIAL

## 2021-03-27 DIAGNOSIS — I10 ESSENTIAL HYPERTENSION: ICD-10-CM

## 2021-03-27 LAB
ANION GAP SERPL CALC-SCNC: 6 MMOL/L (ref 0–18)
BUN BLD-MCNC: 11 MG/DL (ref 7–18)
BUN/CREAT SERPL: 12.6 (ref 10–20)
CALCIUM BLD-MCNC: 8.8 MG/DL (ref 8.5–10.1)
CHLORIDE SERPL-SCNC: 106 MMOL/L (ref 98–112)
CO2 SERPL-SCNC: 27 MMOL/L (ref 21–32)
CREAT BLD-MCNC: 0.87 MG/DL
GLUCOSE BLD-MCNC: 84 MG/DL (ref 70–99)
OSMOLALITY SERPL CALC.SUM OF ELEC: 287 MOSM/KG (ref 275–295)
PATIENT FASTING Y/N/NP: YES
POTASSIUM SERPL-SCNC: 3.8 MMOL/L (ref 3.5–5.1)
SODIUM SERPL-SCNC: 139 MMOL/L (ref 136–145)

## 2021-03-27 PROCEDURE — 80048 BASIC METABOLIC PNL TOTAL CA: CPT

## 2021-03-27 PROCEDURE — 36415 COLL VENOUS BLD VENIPUNCTURE: CPT

## 2021-04-05 ENCOUNTER — OFFICE VISIT (OUTPATIENT)
Dept: FAMILY MEDICINE CLINIC | Facility: CLINIC | Age: 61
End: 2021-04-05
Payer: COMMERCIAL

## 2021-04-05 VITALS
SYSTOLIC BLOOD PRESSURE: 118 MMHG | TEMPERATURE: 98 F | HEIGHT: 69 IN | DIASTOLIC BLOOD PRESSURE: 87 MMHG | BODY MASS INDEX: 39.6 KG/M2 | WEIGHT: 267.38 LBS | HEART RATE: 87 BPM

## 2021-04-05 DIAGNOSIS — Z12.11 SCREENING FOR COLON CANCER: ICD-10-CM

## 2021-04-05 DIAGNOSIS — Z23 NEED FOR VACCINATION: ICD-10-CM

## 2021-04-05 DIAGNOSIS — L30.9 DERMATITIS: ICD-10-CM

## 2021-04-05 DIAGNOSIS — I10 ESSENTIAL HYPERTENSION: Primary | ICD-10-CM

## 2021-04-05 PROCEDURE — 90471 IMMUNIZATION ADMIN: CPT | Performed by: FAMILY MEDICINE

## 2021-04-05 PROCEDURE — 3074F SYST BP LT 130 MM HG: CPT | Performed by: FAMILY MEDICINE

## 2021-04-05 PROCEDURE — 3008F BODY MASS INDEX DOCD: CPT | Performed by: FAMILY MEDICINE

## 2021-04-05 PROCEDURE — 3079F DIAST BP 80-89 MM HG: CPT | Performed by: FAMILY MEDICINE

## 2021-04-05 PROCEDURE — 90750 HZV VACC RECOMBINANT IM: CPT | Performed by: FAMILY MEDICINE

## 2021-04-05 PROCEDURE — 99214 OFFICE O/P EST MOD 30 MIN: CPT | Performed by: FAMILY MEDICINE

## 2021-04-05 RX ORDER — LEVOCETIRIZINE DIHYDROCHLORIDE 5 MG/1
5 TABLET, FILM COATED ORAL EVERY MORNING
Qty: 90 TABLET | Refills: 0 | Status: SHIPPED | OUTPATIENT
Start: 2021-04-05 | End: 2021-05-21

## 2021-04-05 NOTE — PROGRESS NOTES
Patient ID: Hardik Snyder is a 64year old male. HPI  Patient presents with: Follow - Up: hypertension    Last seen by me on 2/1/2021. Pt denies CP and SOB. He is taking amlodipine Besy-Benazepril HCl 5-40 MG for HTN.     Pt c/o itchy rash on Medical History:      Past Medical History:   Diagnosis Date   • Asthma    • Congenital anomaly of heart     heart murmur.  no issues    • High blood pressure    • Lipid screening 9/15/2012    per NG   • MAHESH (obstructive sleep apnea) 11/24/2020   • Pneumoni well-developed and well-nourished. No distress. Head: Normocephalic. Eyes: Conjunctivae and EOM are normal.   Cardiovascular: Normal rate, regular rhythm and normal heart sounds.     Pulmonary/Chest: Effort normal and breath sounds normal. No respirator Number of Visits Requested:3      Follow up if symptoms persist.  Take medicine (if given) as prescribed. Approach to treatment discussed and patient/family member understands and agrees to plan.      Return in about 4 months (around 8/5/2021) for adult ex

## 2021-05-21 ENCOUNTER — OFFICE VISIT (OUTPATIENT)
Dept: FAMILY MEDICINE CLINIC | Facility: CLINIC | Age: 61
End: 2021-05-21
Payer: COMMERCIAL

## 2021-05-21 VITALS
BODY MASS INDEX: 40.11 KG/M2 | HEIGHT: 69 IN | HEART RATE: 77 BPM | TEMPERATURE: 99 F | SYSTOLIC BLOOD PRESSURE: 112 MMHG | WEIGHT: 270.81 LBS | DIASTOLIC BLOOD PRESSURE: 80 MMHG

## 2021-05-21 DIAGNOSIS — R10.32 LEFT GROIN PAIN: ICD-10-CM

## 2021-05-21 DIAGNOSIS — Z90.79 HISTORY OF PROSTATECTOMY: ICD-10-CM

## 2021-05-21 DIAGNOSIS — G44.209 TENSION HEADACHE: Primary | ICD-10-CM

## 2021-05-21 PROCEDURE — 3079F DIAST BP 80-89 MM HG: CPT | Performed by: FAMILY MEDICINE

## 2021-05-21 PROCEDURE — 99214 OFFICE O/P EST MOD 30 MIN: CPT | Performed by: FAMILY MEDICINE

## 2021-05-21 PROCEDURE — 3008F BODY MASS INDEX DOCD: CPT | Performed by: FAMILY MEDICINE

## 2021-05-21 PROCEDURE — 3074F SYST BP LT 130 MM HG: CPT | Performed by: FAMILY MEDICINE

## 2021-05-21 NOTE — PROGRESS NOTES
Patient ID: Richardson Sierra is a 63year old male. HPI  Patient presents with: Other: discomfort on left side of head    Last seen by me on 4/5/2021. Pt is working on cars in his garage. He is not lifting heavy objects.     Pt c/o pain in left s 112/80  04/05/21 : 118/87  02/01/21 : (!) 120/98  01/28/21 : 127/89  12/15/20 : (!) 131/91  12/07/20 : (!) 131/100        Review of Systems   Eyes: Negative for photophobia and visual disturbance. Respiratory: Negative for shortness of breath.     Cardiov 125/91 112/80   Pulse: 77    Temp: 98.5 °F (36.9 °C)    TempSrc: Temporal    Weight: 270 lb 12.8 oz    Height: 5' 9\" (1.753 m)      Physical Exam   Constitutional: Patient is oriented to person, place, and time.  Patient appears well-developed and well-nou prepared under the direction and in the presence of Edward Javier DO. Electronically Signed: Farooq Poe, 5/21/2021, 8:42 AM.    IEdward DO,  personally performed the services described in this documentation.  All medical record entries m

## 2021-07-20 ENCOUNTER — LAB ENCOUNTER (OUTPATIENT)
Dept: LAB | Facility: HOSPITAL | Age: 61
End: 2021-07-20
Attending: UROLOGY
Payer: COMMERCIAL

## 2021-07-20 DIAGNOSIS — C61 PROSTATE CANCER (HCC): ICD-10-CM

## 2021-07-20 LAB — PSA SERPL-MCNC: <0.01 NG/ML (ref ?–4)

## 2021-07-20 PROCEDURE — 84153 ASSAY OF PSA TOTAL: CPT

## 2021-07-20 PROCEDURE — 36415 COLL VENOUS BLD VENIPUNCTURE: CPT

## 2021-08-04 ENCOUNTER — OFFICE VISIT (OUTPATIENT)
Dept: SURGERY | Facility: CLINIC | Age: 61
End: 2021-08-04
Payer: COMMERCIAL

## 2021-08-04 VITALS — DIASTOLIC BLOOD PRESSURE: 89 MMHG | HEART RATE: 80 BPM | SYSTOLIC BLOOD PRESSURE: 121 MMHG

## 2021-08-04 DIAGNOSIS — R10.30 LOWER ABDOMINAL PAIN: ICD-10-CM

## 2021-08-04 DIAGNOSIS — N52.31 ERECTILE DYSFUNCTION AFTER RADICAL PROSTATECTOMY: ICD-10-CM

## 2021-08-04 DIAGNOSIS — C61 PROSTATE CANCER (HCC): Primary | ICD-10-CM

## 2021-08-04 DIAGNOSIS — N39.3 MALE STRESS INCONTINENCE: ICD-10-CM

## 2021-08-04 PROCEDURE — 3079F DIAST BP 80-89 MM HG: CPT | Performed by: UROLOGY

## 2021-08-04 PROCEDURE — 99214 OFFICE O/P EST MOD 30 MIN: CPT | Performed by: UROLOGY

## 2021-08-04 PROCEDURE — 3074F SYST BP LT 130 MM HG: CPT | Performed by: UROLOGY

## 2021-08-04 RX ORDER — TADALAFIL 20 MG/1
20 TABLET ORAL
Qty: 10 TABLET | Refills: 1 | Status: SHIPPED | OUTPATIENT
Start: 2021-08-04

## 2021-08-04 NOTE — PROGRESS NOTES
[facility], [facility] Urology  Follow-Up Visit    HPI: Brandin Valentin is a 63year old male presents for a follow up visit. Patient was last seen on 1/28/2021. Here by himself. INTERVAL HISTORY: s/p RALP 11/24/20.  Postop course complicated by CAMILLE and ol longer that sexually active at this point. Him and his wife have not had sex in over a year. He reports his erections are generally weak. He rates the quality of his erections at a 3/10.    - 11/24/20: Status post RALP.  Postop course complicated by CAMILLE an person, place, and time. He appears well-developed. HENT:   Head: Normocephalic. Eyes: Conjunctivae are normal.   Cardiovascular: Normal rate. Pulmonary/Chest: Effort normal.   Abdominal: Soft. He exhibits no distension.  There is no abdominal tender Final pathology grade Group 1, no adverse pathologic findings, negative margins (pT2 Nx R0). Post-op vesicourethral anastomotic leak managed with prolonged huitron drainage for 3 weeks. Cystogram confirmed resolution. Findings reviewed with patient.

## 2021-08-28 DIAGNOSIS — I10 ESSENTIAL HYPERTENSION: ICD-10-CM

## 2021-08-28 RX ORDER — AMLODIPINE BESYLATE AND BENAZEPRIL HYDROCHLORIDE 5; 40 MG/1; MG/1
1 CAPSULE ORAL DAILY
Qty: 90 CAPSULE | Refills: 1 | Status: SHIPPED | OUTPATIENT
Start: 2021-08-28

## 2021-08-28 NOTE — TELEPHONE ENCOUNTER
[facility].   Requested Prescriptions   Pending Prescriptions Disp Refills    AMLODIPINE BESY-BENAZEPRIL HCL 5-40 MG Oral Cap [Pharmacy Med Name: AMLODIPINE-BENAZEPRIL 5-40 MG] 90 capsule 1     Sig: TAKE 1 CAPSULE BY MOUTH EV

## 2021-08-30 ENCOUNTER — OFFICE VISIT (OUTPATIENT)
Dept: FAMILY MEDICINE CLINIC | Facility: CLINIC | Age: 61
End: 2021-08-30
Payer: COMMERCIAL

## 2021-08-30 VITALS
BODY MASS INDEX: 40.58 KG/M2 | HEIGHT: 69 IN | DIASTOLIC BLOOD PRESSURE: 91 MMHG | WEIGHT: 274 LBS | HEART RATE: 75 BPM | TEMPERATURE: 98 F | SYSTOLIC BLOOD PRESSURE: 131 MMHG

## 2021-08-30 DIAGNOSIS — R10.31 RIGHT GROIN PAIN: Primary | ICD-10-CM

## 2021-08-30 DIAGNOSIS — C61 PROSTATE CANCER (HCC): ICD-10-CM

## 2021-08-30 DIAGNOSIS — K42.9 UMBILICAL HERNIA WITHOUT OBSTRUCTION AND WITHOUT GANGRENE: ICD-10-CM

## 2021-08-30 PROCEDURE — 99214 OFFICE O/P EST MOD 30 MIN: CPT | Performed by: FAMILY MEDICINE

## 2021-08-30 PROCEDURE — 3008F BODY MASS INDEX DOCD: CPT | Performed by: FAMILY MEDICINE

## 2021-08-30 PROCEDURE — 3080F DIAST BP >= 90 MM HG: CPT | Performed by: FAMILY MEDICINE

## 2021-08-30 PROCEDURE — 3075F SYST BP GE 130 - 139MM HG: CPT | Performed by: FAMILY MEDICINE

## 2021-08-30 NOTE — PROGRESS NOTES
Patient ID: Seble Mckinnon is a 63year old male. HPI  Patient presents with: Follow - Up: from Urology   Abdominal Pain: Groin pain - Hernia     Last seen by me on 5/21/2021. Pt c/o pain in the right groin.  He states he walks often at work a kg/m²  12/02/20 : 38.84 kg/m²      BP Readings from Last 6 Encounters:  08/30/21 : (!) 131/91  08/04/21 : 121/89  05/21/21 : 112/80  04/05/21 : 118/87  02/01/21 : (!) 120/98  01/28/21 : 127/89        Review of Systems   Respiratory: Negative for shortness lb.    Physical Exam   Constitutional: Patient is oriented to person, place, and time. Patient appears well-developed and well-nourished. No distress. Head: Normocephalic.    Eyes: Conjunctivae and EOM are normal.   Neurological: Patient is alert and orie DO Enrique. Electronically Signed: Vanesa Styles, 8/30/2021, 10:21 AM.    I, Parish Pascual DO,  personally performed the services described in this documentation. All medical record entries made by the scribe were at my direction and in my presence.

## 2021-09-08 ENCOUNTER — OFFICE VISIT (OUTPATIENT)
Dept: SURGERY | Facility: CLINIC | Age: 61
End: 2021-09-08
Payer: COMMERCIAL

## 2021-09-08 DIAGNOSIS — K42.9 UMBILICAL HERNIA WITHOUT OBSTRUCTION AND WITHOUT GANGRENE: ICD-10-CM

## 2021-09-08 DIAGNOSIS — R10.31 RIGHT GROIN PAIN: Primary | ICD-10-CM

## 2021-09-08 PROCEDURE — 99244 OFF/OP CNSLTJ NEW/EST MOD 40: CPT | Performed by: SURGERY

## 2021-09-14 ENCOUNTER — OFFICE VISIT (OUTPATIENT)
Dept: FAMILY MEDICINE CLINIC | Facility: CLINIC | Age: 61
End: 2021-09-14
Payer: COMMERCIAL

## 2021-09-14 ENCOUNTER — HOSPITAL ENCOUNTER (OUTPATIENT)
Dept: GENERAL RADIOLOGY | Age: 61
Discharge: HOME | End: 2021-09-14
Attending: FAMILY MEDICINE
Payer: COMMERCIAL

## 2021-09-14 VITALS
WEIGHT: 273 LBS | HEART RATE: 85 BPM | HEIGHT: 69 IN | DIASTOLIC BLOOD PRESSURE: 77 MMHG | SYSTOLIC BLOOD PRESSURE: 104 MMHG | TEMPERATURE: 98 F | BODY MASS INDEX: 40.43 KG/M2

## 2021-09-14 DIAGNOSIS — R10.31 RIGHT GROIN PAIN: ICD-10-CM

## 2021-09-14 DIAGNOSIS — I10 ESSENTIAL HYPERTENSION: ICD-10-CM

## 2021-09-14 DIAGNOSIS — R10.31 RIGHT GROIN PAIN: Primary | ICD-10-CM

## 2021-09-14 DIAGNOSIS — C61 PROSTATE CANCER (HCC): ICD-10-CM

## 2021-09-14 PROCEDURE — 3078F DIAST BP <80 MM HG: CPT | Performed by: FAMILY MEDICINE

## 2021-09-14 PROCEDURE — 72170 X-RAY EXAM OF PELVIS: CPT | Performed by: FAMILY MEDICINE

## 2021-09-14 PROCEDURE — 99214 OFFICE O/P EST MOD 30 MIN: CPT | Performed by: FAMILY MEDICINE

## 2021-09-14 PROCEDURE — 72110 X-RAY EXAM L-2 SPINE 4/>VWS: CPT | Performed by: FAMILY MEDICINE

## 2021-09-14 PROCEDURE — 3074F SYST BP LT 130 MM HG: CPT | Performed by: FAMILY MEDICINE

## 2021-09-14 PROCEDURE — 3008F BODY MASS INDEX DOCD: CPT | Performed by: FAMILY MEDICINE

## 2021-09-14 RX ORDER — GABAPENTIN 300 MG/1
CAPSULE ORAL
Qty: 180 CAPSULE | Refills: 0 | Status: SHIPPED | OUTPATIENT
Start: 2021-09-14

## 2021-09-14 NOTE — PROGRESS NOTES
Patient ID: Brandin Valentin is a 63year old male. HPI  Patient presents with: Follow - Up: Groin pain    Last seen by me on 8/30/2021. Pt c/o \"poking\" right sided groin pain. He has pain when standing, bending, twisting and being active.  He reading glasses       Past Surgical History:   Procedure Laterality Date   • AMPUTATION FINGER/THUMB Left 2010    table saw injury    • BIOPSY OF PROSTATE,NEEDLE/PUNCH Bilateral 09/01/2020    Dr. Jerome Mcburney   • LAPAROSCOPY, SURGICAL PROSTATECTOMY, RETROPUBIC R PELVIS (1 VIEW) (CPT=72170); Future  -     XR LUMBAR SPINE (MIN 4 VIEWS) (CPT=72110); Future  -     gabapentin 300 MG Oral Cap; 1 capsule by mouth at bedtime for the first 5 days and then start taking it twice daily. We will do some basic x-rays.   We will

## 2021-12-13 ENCOUNTER — OFFICE VISIT (OUTPATIENT)
Dept: SURGERY | Facility: CLINIC | Age: 61
End: 2021-12-13
Payer: COMMERCIAL

## 2021-12-13 DIAGNOSIS — C61 PROSTATE CANCER (HCC): Primary | ICD-10-CM

## 2021-12-13 DIAGNOSIS — N39.3 MALE STRESS INCONTINENCE: ICD-10-CM

## 2021-12-13 PROCEDURE — 99213 OFFICE O/P EST LOW 20 MIN: CPT | Performed by: UROLOGY

## 2021-12-13 NOTE — PROGRESS NOTES
Cystoscopy schedule for January 26, 2022 at 1500. Instruction sheet given and went over procedure with pt.

## 2021-12-13 NOTE — PROGRESS NOTES
[facility] Urology  Follow-Up Visit    HPI: Richie Salgado is a 63year old male presents for a follow up visit. Patient was last seen on 8/4/2021. Here by himself. INTERVAL HISTORY: s/p RALP 11/24/20.  Postop course complicated by CAMILLE and luís nocturia x1-2, minimal daytime frequency. His AUA SI score is 7. He denies intermittency, feeling of incomplete bladder emptying, or straining to urinate.  He denies dysuria or gross hematuria, history of recurrent UTIs or nephrolithiasis.     Patient is n HISTORY: left hand surgery (trauma), RALP (11/24/20).      SOCIAL HISTORY: Patient is  and has 3 children. He denies smoking. He drinks alcohol socially. He works as a  and drives a car.     Reviewed past medical, surgical, family, an 2/29/2020 6.03 (H)       IMAGING:    Dry CT Abdomen + Pelvis (11/25/20):  1. No renal obstruction, or significant hematoma to account for the diminished urine output. 2. Expected postsurgical changes related to prostatectomy.   Small amount of ascites an Seeing general surgery for further evaluation. Discussed with patient. Discussed possibility of performing office cystoscopy for further evaluation for any urethral or bladder pathology that might be contributing to his discomfort. Patient agreeable.

## 2022-01-22 ENCOUNTER — LAB ENCOUNTER (OUTPATIENT)
Dept: LAB | Age: 62
End: 2022-01-22
Attending: UROLOGY
Payer: COMMERCIAL

## 2022-01-22 DIAGNOSIS — C61 PROSTATE CANCER (HCC): ICD-10-CM

## 2022-01-22 LAB — PSA SERPL-MCNC: <0.01 NG/ML (ref ?–4)

## 2022-01-22 PROCEDURE — 84153 ASSAY OF PSA TOTAL: CPT

## 2022-01-22 PROCEDURE — 36415 COLL VENOUS BLD VENIPUNCTURE: CPT

## 2022-01-25 ENCOUNTER — TELEPHONE (OUTPATIENT)
Dept: SURGERY | Facility: CLINIC | Age: 62
End: 2022-01-25

## 2022-01-26 ENCOUNTER — PROCEDURE (OUTPATIENT)
Dept: SURGERY | Facility: CLINIC | Age: 62
End: 2022-01-26
Payer: COMMERCIAL

## 2022-01-26 VITALS
DIASTOLIC BLOOD PRESSURE: 88 MMHG | WEIGHT: 267 LBS | HEIGHT: 70 IN | HEART RATE: 79 BPM | SYSTOLIC BLOOD PRESSURE: 142 MMHG | BODY MASS INDEX: 38.22 KG/M2

## 2022-01-26 DIAGNOSIS — N21.1 URETHRAL STONE: ICD-10-CM

## 2022-01-26 DIAGNOSIS — C61 PROSTATE CANCER (HCC): Primary | ICD-10-CM

## 2022-01-26 PROCEDURE — 99213 OFFICE O/P EST LOW 20 MIN: CPT | Performed by: UROLOGY

## 2022-01-26 PROCEDURE — 52310 CYSTOSCOPY AND TREATMENT: CPT | Performed by: UROLOGY

## 2022-01-26 PROCEDURE — 3079F DIAST BP 80-89 MM HG: CPT | Performed by: UROLOGY

## 2022-01-26 PROCEDURE — 3008F BODY MASS INDEX DOCD: CPT | Performed by: UROLOGY

## 2022-01-26 PROCEDURE — 82365 CALCULUS SPECTROSCOPY: CPT | Performed by: UROLOGY

## 2022-01-26 PROCEDURE — 3077F SYST BP >= 140 MM HG: CPT | Performed by: UROLOGY

## 2022-01-26 RX ORDER — CIPROFLOXACIN 500 MG/1
500 TABLET, FILM COATED ORAL ONCE
Status: COMPLETED | OUTPATIENT
Start: 2022-01-26 | End: 2022-01-26

## 2022-01-26 RX ADMIN — CIPROFLOXACIN 500 MG: 500 TABLET, FILM COATED ORAL at 16:43:00

## 2022-01-26 NOTE — PROGRESS NOTES
[facility], [facility] Urology  Follow-Up Visit    HPI: Estrella Pat is a 63year old male presents for a follow up visit. Patient was last seen on 12/13/2021. Here by himself. INTERVAL HISTORY: s/p RALP 11/24/20.  Postop course complicated by CAMILLE and o PSA-D: 0.223 ng/mL/cc. – Clinical T1c.     Patient otherwise reports mild LUTS including nocturia x1-2, minimal daytime frequency. His AUA SI score is 7. He denies intermittency, feeling of incomplete bladder emptying, or straining to urinate.  He denies follow-up. - 1/2022 F/U: PSA undetectable. Stress urinary incontinence using 2-4 pads per day. Has not scheduled pelvic floor PT.   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 Anesthesia:  2% lidocaine gel    Urethra: Normal without strictures or masses. Prostate / Pelvic: Surgically absent prostate. There is a 3 to 4 mm stone within the prostatic fossa below the bladder neck on the right side.   Bladder neck wi endoscopically. Discussed with patient. If the stone was responsible for patient's reported groin discomfort, that hopefully this should improve at this point.   If he continues to have the nonspecific right groin pain, then he was instructed to seek fu

## 2022-01-30 LAB — CALCULI MASS: 13 MG

## 2022-03-18 DIAGNOSIS — I10 ESSENTIAL HYPERTENSION: ICD-10-CM

## 2022-03-18 RX ORDER — AMLODIPINE AND BENAZEPRIL HYDROCHLORIDE 5; 40 MG/1; MG/1
1 CAPSULE ORAL DAILY
Qty: 90 CAPSULE | Refills: 0 | Status: SHIPPED | OUTPATIENT
Start: 2022-03-18 | End: 2022-06-28

## 2022-03-19 NOTE — TELEPHONE ENCOUNTER
Refill passed per [facility], [facility] protocol. Pt is due for OV    Requested Prescriptions   Pending Prescriptions Disp Refills    AMLODIPINE BESY-BENAZEPRIL HCL 5-40 MG Oral Cap [Pharmacy Med Name: AMLODIPINE-BENAZEPRIL 5-40 MG] 30 capsule 5     Sig: TAKE 1 CAPSULE BY MOUTH EVERY DAY        Hypertensive Medications Protocol Failed - 3/18/2022 12:01 AM        Failed - Appointment in past 6 or next 3 months        Passed - CMP or BMP in past 12 months        Passed - GFR Non- > 50     Lab Results   Component Value Date    GFRNAA 95 03/27/2021                        Recent Outpatient Visits              3 months ago Prostate cancer [facility])    [facility], [address], Radha Jackman MD    Office Visit    6 months ago Right groin pain    [facility], [facility], [address], [address], Danni, DO    Office Visit    6 months ago Right groin pain    [facility] Surgery Keke Mistry MD    Office Visit    6 months ago Right groin pain    [facility] [address], Danni, DO    Office Visit    7 months ago Prostate cancer [facility])    [facility], [address], Radha Jackman MD    Office Visit             Future Appointments         Provider Department Appt Notes    In 4 months Bogdan Alegria MD [facility], [address] [facility] Return in about 6 months (around 7/26/2022).

## 2022-03-19 NOTE — TELEPHONE ENCOUNTER
Refill passed per [address] protocol. Requested Prescriptions   Pending Prescriptions Disp Refills    AMLODIPINE BESY-BENAZEPRIL HCL 5-40 MG Oral Cap [Pharmacy Med Name: AMLODIPINE-BENAZEPRIL 5-40 MG] 30 capsule 5     Sig: TAKE 1 CAPSULE BY MOUTH EVERY DAY        Hypertensive Medications Protocol Failed - 3/18/2022 12:01 AM        Failed - Appointment in past 6 or next 3 months        Passed - CMP or BMP in past 12 months        Passed - GFR Non- > 50     Lab Results   Component Value Date    GFRNAA 95 03/27/2021                        Recent Outpatient Visits              3 months ago Prostate cancer [facility])    [facility], [address], Leonardo Rahman MD    Office Visit    6 months ago Right groin pain    [address], [address], [address], [address], DO    Office Visit    6 months ago Right groin pain    [facility] Surgery Kip Dandy, MD    Office Visit    6 months ago Right groin pain    [facility], [address], [address] Lopez, DO    Office Visit    7 months ago Prostate cancer [facility])    [facility], [address], Leonardo Rahman MD    Office Visit             Future Appointments         Provider Department Appt Notes    In 4 months Amina Edmond MD [facility], [address] Return in about 6 months (around 7/26/2022).

## 2022-06-26 DIAGNOSIS — I10 ESSENTIAL HYPERTENSION: ICD-10-CM

## 2022-06-28 RX ORDER — AMLODIPINE AND BENAZEPRIL HYDROCHLORIDE 5; 40 MG/1; MG/1
1 CAPSULE ORAL DAILY
Qty: 30 CAPSULE | Refills: 0 | Status: SHIPPED | OUTPATIENT
Start: 2022-06-28 | End: 2022-07-26

## 2022-06-29 NOTE — TELEPHONE ENCOUNTER
1st attempt - Spontlyt message sent for patient to contact the office to schedule an appointment; see notes below.

## 2022-07-26 ENCOUNTER — LAB ENCOUNTER (OUTPATIENT)
Dept: LAB | Age: 62
End: 2022-07-26
Attending: FAMILY MEDICINE
Payer: COMMERCIAL

## 2022-07-26 ENCOUNTER — OFFICE VISIT (OUTPATIENT)
Dept: FAMILY MEDICINE CLINIC | Facility: CLINIC | Age: 62
End: 2022-07-26
Payer: COMMERCIAL

## 2022-07-26 VITALS
DIASTOLIC BLOOD PRESSURE: 85 MMHG | TEMPERATURE: 98 F | SYSTOLIC BLOOD PRESSURE: 131 MMHG | BODY MASS INDEX: 41.03 KG/M2 | HEIGHT: 69 IN | HEART RATE: 76 BPM | WEIGHT: 277 LBS

## 2022-07-26 DIAGNOSIS — L30.9 DERMATITIS: ICD-10-CM

## 2022-07-26 DIAGNOSIS — R60.0 BILATERAL LEG EDEMA: Primary | ICD-10-CM

## 2022-07-26 DIAGNOSIS — R60.0 BILATERAL LEG EDEMA: ICD-10-CM

## 2022-07-26 DIAGNOSIS — I87.2 VENOUS STASIS DERMATITIS OF BOTH LOWER EXTREMITIES: ICD-10-CM

## 2022-07-26 LAB
ANION GAP SERPL CALC-SCNC: 6 MMOL/L (ref 0–18)
BUN BLD-MCNC: 15 MG/DL (ref 7–18)
BUN/CREAT SERPL: 15 (ref 10–20)
CALCIUM BLD-MCNC: 9.5 MG/DL (ref 8.5–10.1)
CHLORIDE SERPL-SCNC: 107 MMOL/L (ref 98–112)
CO2 SERPL-SCNC: 29 MMOL/L (ref 21–32)
CREAT BLD-MCNC: 1 MG/DL
FASTING STATUS PATIENT QL REPORTED: NO
GLUCOSE BLD-MCNC: 109 MG/DL (ref 70–99)
OSMOLALITY SERPL CALC.SUM OF ELEC: 295 MOSM/KG (ref 275–295)
POTASSIUM SERPL-SCNC: 3.9 MMOL/L (ref 3.5–5.1)
SODIUM SERPL-SCNC: 142 MMOL/L (ref 136–145)

## 2022-07-26 PROCEDURE — 3075F SYST BP GE 130 - 139MM HG: CPT | Performed by: FAMILY MEDICINE

## 2022-07-26 PROCEDURE — 36415 COLL VENOUS BLD VENIPUNCTURE: CPT

## 2022-07-26 PROCEDURE — 3079F DIAST BP 80-89 MM HG: CPT | Performed by: FAMILY MEDICINE

## 2022-07-26 PROCEDURE — 80048 BASIC METABOLIC PNL TOTAL CA: CPT

## 2022-07-26 PROCEDURE — 99214 OFFICE O/P EST MOD 30 MIN: CPT | Performed by: FAMILY MEDICINE

## 2022-07-26 PROCEDURE — 3008F BODY MASS INDEX DOCD: CPT | Performed by: FAMILY MEDICINE

## 2022-07-26 RX ORDER — BETAMETHASONE DIPROPIONATE 0.5 MG/G
1 CREAM TOPICAL 2 TIMES DAILY
Qty: 50 G | Refills: 0 | Status: SHIPPED | OUTPATIENT
Start: 2022-07-26

## 2022-07-26 RX ORDER — OLMESARTAN MEDOXOMIL 40 MG/1
40 TABLET ORAL DAILY
Qty: 90 TABLET | Refills: 1 | Status: SHIPPED | OUTPATIENT
Start: 2022-07-26

## 2022-07-26 NOTE — PATIENT INSTRUCTIONS
We are going to have you stop the amlodipine/benazepril 5/40 mg as that is probably causing your leg swelling. You are going to take Benicar 40 mg instead for blood pressure. I sent in a cream that you use twice daily for the rash. Elevate your legs is much as you can. I will see you back in 3 to 4 weeks for blood pressure follow-up and to make sure that your legs are resolved with the swelling.

## 2022-07-28 ENCOUNTER — TELEPHONE (OUTPATIENT)
Dept: FAMILY MEDICINE CLINIC | Facility: CLINIC | Age: 62
End: 2022-07-28

## 2022-10-29 ENCOUNTER — OFFICE VISIT (OUTPATIENT)
Dept: FAMILY MEDICINE CLINIC | Facility: CLINIC | Age: 62
End: 2022-10-29
Payer: COMMERCIAL

## 2022-10-29 VITALS
BODY MASS INDEX: 38.46 KG/M2 | TEMPERATURE: 97 F | DIASTOLIC BLOOD PRESSURE: 96 MMHG | HEIGHT: 70 IN | SYSTOLIC BLOOD PRESSURE: 138 MMHG | HEART RATE: 78 BPM | WEIGHT: 268.63 LBS | OXYGEN SATURATION: 99 % | RESPIRATION RATE: 20 BRPM

## 2022-10-29 DIAGNOSIS — J22 LOWER RESPIRATORY INFECTION: Primary | ICD-10-CM

## 2022-10-29 DIAGNOSIS — J45.901 MILD ASTHMA EXACERBATION: ICD-10-CM

## 2022-10-29 DIAGNOSIS — Z87.01 HISTORY OF PNEUMONIA: ICD-10-CM

## 2022-10-29 PROCEDURE — 3008F BODY MASS INDEX DOCD: CPT | Performed by: NURSE PRACTITIONER

## 2022-10-29 PROCEDURE — 3075F SYST BP GE 130 - 139MM HG: CPT | Performed by: NURSE PRACTITIONER

## 2022-10-29 PROCEDURE — 3080F DIAST BP >= 90 MM HG: CPT | Performed by: NURSE PRACTITIONER

## 2022-10-29 PROCEDURE — 99213 OFFICE O/P EST LOW 20 MIN: CPT | Performed by: NURSE PRACTITIONER

## 2022-10-29 RX ORDER — AZITHROMYCIN 250 MG/1
TABLET, FILM COATED ORAL
Qty: 6 TABLET | Refills: 0 | Status: SHIPPED | OUTPATIENT
Start: 2022-10-29 | End: 2022-11-03

## 2022-10-29 RX ORDER — PREDNISONE 20 MG/1
TABLET ORAL
Qty: 10 TABLET | Refills: 0 | Status: SHIPPED | OUTPATIENT
Start: 2022-10-29

## 2022-11-29 ENCOUNTER — LAB ENCOUNTER (OUTPATIENT)
Dept: LAB | Facility: HOSPITAL | Age: 62
End: 2022-11-29
Attending: UROLOGY
Payer: COMMERCIAL

## 2022-11-29 DIAGNOSIS — C61 PROSTATE CANCER (HCC): ICD-10-CM

## 2022-11-29 LAB — PSA SERPL-MCNC: <0.01 NG/ML (ref ?–4)

## 2022-11-29 PROCEDURE — 84153 ASSAY OF PSA TOTAL: CPT

## 2022-11-29 PROCEDURE — 36415 COLL VENOUS BLD VENIPUNCTURE: CPT

## 2023-03-27 ENCOUNTER — HOSPITAL ENCOUNTER (EMERGENCY)
Facility: HOSPITAL | Age: 63
Discharge: HOME | End: 2023-03-27
Attending: EMERGENCY MEDICINE
Payer: COMMERCIAL

## 2023-03-27 ENCOUNTER — OFFICE VISIT (OUTPATIENT)
Dept: FAMILY MEDICINE CLINIC | Facility: CLINIC | Age: 63
End: 2023-03-27
Payer: COMMERCIAL

## 2023-03-27 VITALS
HEART RATE: 68 BPM | OXYGEN SATURATION: 97 % | DIASTOLIC BLOOD PRESSURE: 104 MMHG | TEMPERATURE: 98 F | RESPIRATION RATE: 18 BRPM | SYSTOLIC BLOOD PRESSURE: 158 MMHG

## 2023-03-27 VITALS
TEMPERATURE: 97 F | WEIGHT: 283 LBS | OXYGEN SATURATION: 96 % | HEIGHT: 69 IN | HEART RATE: 73 BPM | DIASTOLIC BLOOD PRESSURE: 104 MMHG | SYSTOLIC BLOOD PRESSURE: 149 MMHG | BODY MASS INDEX: 41.92 KG/M2 | RESPIRATION RATE: 20 BRPM

## 2023-03-27 DIAGNOSIS — T15.92XA ACUTE FOREIGN BODY OF LEFT EYE, INITIAL ENCOUNTER: Primary | ICD-10-CM

## 2023-03-27 DIAGNOSIS — H18.892 CORNEAL RUST RING OF LEFT EYE: Primary | ICD-10-CM

## 2023-03-27 PROCEDURE — 99283 EMERGENCY DEPT VISIT LOW MDM: CPT

## 2023-03-27 PROCEDURE — 65222 REMOVE FOREIGN BODY FROM EYE: CPT

## 2023-03-27 PROCEDURE — 3077F SYST BP >= 140 MM HG: CPT | Performed by: PHYSICIAN ASSISTANT

## 2023-03-27 PROCEDURE — 99284 EMERGENCY DEPT VISIT MOD MDM: CPT

## 2023-03-27 PROCEDURE — 3008F BODY MASS INDEX DOCD: CPT | Performed by: PHYSICIAN ASSISTANT

## 2023-03-27 PROCEDURE — 3080F DIAST BP >= 90 MM HG: CPT | Performed by: PHYSICIAN ASSISTANT

## 2023-03-27 PROCEDURE — 99213 OFFICE O/P EST LOW 20 MIN: CPT | Performed by: PHYSICIAN ASSISTANT

## 2023-03-27 RX ORDER — ERYTHROMYCIN 5 MG/G
1 OINTMENT OPHTHALMIC ONCE
Status: COMPLETED | OUTPATIENT
Start: 2023-03-27 | End: 2023-03-27

## 2023-03-27 RX ORDER — TETRACAINE HYDROCHLORIDE 5 MG/ML
1 SOLUTION OPHTHALMIC ONCE
Status: COMPLETED | OUTPATIENT
Start: 2023-03-27 | End: 2023-03-27

## 2023-03-27 NOTE — ED INITIAL ASSESSMENT (HPI)
Pt AOx4 C/C foreign body in left eye since Saturday, piece of aluminum from grinding cars in a shop. Was seen at [address] [place] [address] and confirmed foreign body. Pain and redness to sclera.

## 2023-04-17 ENCOUNTER — HOSPITAL ENCOUNTER (OUTPATIENT)
Dept: GENERAL RADIOLOGY | Age: 63
Discharge: HOME | End: 2023-04-17
Attending: FAMILY MEDICINE
Payer: COMMERCIAL

## 2023-04-17 ENCOUNTER — OFFICE VISIT (OUTPATIENT)
Dept: FAMILY MEDICINE CLINIC | Facility: CLINIC | Age: 63
End: 2023-04-17

## 2023-04-17 VITALS
TEMPERATURE: 97 F | HEIGHT: 69 IN | DIASTOLIC BLOOD PRESSURE: 87 MMHG | HEART RATE: 84 BPM | SYSTOLIC BLOOD PRESSURE: 142 MMHG | BODY MASS INDEX: 41.62 KG/M2 | WEIGHT: 281 LBS

## 2023-04-17 DIAGNOSIS — I10 ESSENTIAL HYPERTENSION: ICD-10-CM

## 2023-04-17 DIAGNOSIS — G89.29 CHRONIC RIGHT SHOULDER PAIN: ICD-10-CM

## 2023-04-17 DIAGNOSIS — M25.511 CHRONIC RIGHT SHOULDER PAIN: ICD-10-CM

## 2023-04-17 DIAGNOSIS — M75.41 ROTATOR CUFF IMPINGEMENT SYNDROME OF RIGHT SHOULDER: ICD-10-CM

## 2023-04-17 DIAGNOSIS — Z12.11 SCREENING FOR COLON CANCER: ICD-10-CM

## 2023-04-17 DIAGNOSIS — M25.511 CHRONIC RIGHT SHOULDER PAIN: Primary | ICD-10-CM

## 2023-04-17 DIAGNOSIS — G89.29 CHRONIC RIGHT SHOULDER PAIN: Primary | ICD-10-CM

## 2023-04-17 DIAGNOSIS — J45.20 MILD INTERMITTENT ASTHMA WITHOUT COMPLICATION: ICD-10-CM

## 2023-04-17 PROCEDURE — 3008F BODY MASS INDEX DOCD: CPT | Performed by: FAMILY MEDICINE

## 2023-04-17 PROCEDURE — 99214 OFFICE O/P EST MOD 30 MIN: CPT | Performed by: FAMILY MEDICINE

## 2023-04-17 PROCEDURE — 73030 X-RAY EXAM OF SHOULDER: CPT | Performed by: FAMILY MEDICINE

## 2023-04-17 PROCEDURE — 3079F DIAST BP 80-89 MM HG: CPT | Performed by: FAMILY MEDICINE

## 2023-04-17 PROCEDURE — 3077F SYST BP >= 140 MM HG: CPT | Performed by: FAMILY MEDICINE

## 2023-04-17 RX ORDER — CELECOXIB 200 MG/1
200 CAPSULE ORAL DAILY
Qty: 90 CAPSULE | Refills: 0 | Status: SHIPPED | OUTPATIENT
Start: 2023-04-17

## 2023-04-17 RX ORDER — ALBUTEROL SULFATE 90 UG/1
2 AEROSOL, METERED RESPIRATORY (INHALATION) EVERY 4 HOURS PRN
Qty: 3 EACH | Refills: 1 | Status: SHIPPED | OUTPATIENT
Start: 2023-04-17

## 2023-08-16 DIAGNOSIS — L30.9 DERMATITIS: ICD-10-CM

## 2023-08-16 DIAGNOSIS — R60.0 BILATERAL LEG EDEMA: ICD-10-CM

## 2023-08-16 DIAGNOSIS — I87.2 VENOUS STASIS DERMATITIS OF BOTH LOWER EXTREMITIES: ICD-10-CM

## 2023-08-16 NOTE — TELEPHONE ENCOUNTER
Please review refill failed/no protocol     Requested Prescriptions     Pending Prescriptions Disp Refills    OLMESARTAN MEDOXOMIL 40 MG Oral Tab [Pharmacy Med Name: OLMESARTAN MEDOXOMIL 40 MG TAB] 90 tablet 0     Sig: Take 1 tablet (40 mg total) by mouth daily. For blood pressure         Recent Visits  Date Type Provider Dept   04/17/23 Office Visit Marium Swift DO [facility]   07/26/22 Office Visit Marium Swift DO [facility]   Showing recent visits within past 540 days with a meds authorizing provider and meeting all other requirements  Future Appointments  No visits were found meeting these conditions. Showing future appointments within next 150 days with a meds authorizing provider and meeting all other requirements    Requested Prescriptions   Pending Prescriptions Disp Refills    OLMESARTAN MEDOXOMIL 40 MG Oral Tab [Pharmacy Med Name: OLMESARTAN MEDOXOMIL 40 MG TAB] 90 tablet 0     Sig: Take 1 tablet (40 mg total) by mouth daily. For blood pressure       Hypertensive Medications Protocol Failed - 8/16/2023 12:04 AM        Failed - Last BP reading less than 140/90     BP Readings from Last 1 Encounters:  04/17/23 : 142/87              Failed - CMP or BMP in past 6 months     No results found for this or any previous visit (from the past 4392 hour(s)).             Failed - EGFRCR or GFRNAA > 50     GFR Evaluation            Passed - In person appointment in the past 12 or next 3 months     Recent Outpatient Visits              4 months ago Chronic right shoulder pain    [facility], [address], Marissamichelle Swift DO    Office Visit    4 months ago Acute foreign body of left eye, initial encounter    [facility], [address], [place], [place], [employer]    Office Visit    9 months ago Lower respiratory infection    [facility], [address], [address], [employer]    Office Visit    1 year ago Bilateral leg edema    [facility], [address], Dave Manzokacey Titus, DO    Office Visit    1 year ago Prostate cancer [facility])    [facility]    Office Visit                      Passed - In person appointment or virtual visit in the past 6 months     Recent Outpatient Visits              4 months ago Chronic right shoulder pain    [facility], [address], Danni, DO    Office Visit    4 months ago Acute foreign body of left eye, initial encounter    [address], [address], [address], [place], [place], [employer]    Office Visit    9 months ago Lower respiratory infection    [address], [address], [address], [address] [facility], [employer]    Office Visit    1 year ago Bilateral leg edema    [facility], [address], Danni, DO    Office Visit    1 year ago Prostate cancer [facility])    [facility]    Office Visit

## 2023-08-17 RX ORDER — OLMESARTAN MEDOXOMIL 40 MG/1
40 TABLET ORAL DAILY
Qty: 90 TABLET | Refills: 0 | Status: SHIPPED | OUTPATIENT
Start: 2023-08-17

## 2023-11-17 DIAGNOSIS — I87.2 VENOUS STASIS DERMATITIS OF BOTH LOWER EXTREMITIES: ICD-10-CM

## 2023-11-17 DIAGNOSIS — L30.9 DERMATITIS: ICD-10-CM

## 2023-11-17 DIAGNOSIS — R60.0 BILATERAL LEG EDEMA: ICD-10-CM

## 2023-11-17 NOTE — TELEPHONE ENCOUNTER
Please review. Protocol failed / Has no protocol. Requested Prescriptions   Pending Prescriptions Disp Refills    OLMESARTAN MEDOXOMIL 40 MG Oral Tab [Pharmacy Med Name: OLMESARTAN MEDOXOMIL 40 MG TAB] 90 tablet 0     Sig: Take 1 tablet (40 mg total) by mouth daily. For blood pressure       Hypertensive Medications Protocol Failed - 11/17/2023 12:05 AM        Failed - Last BP reading less than 140/90     BP Readings from Last 1 Encounters:   04/17/23 142/87               Failed - CMP or BMP in past 6 months     No results found for this or any previous visit (from the past 4392 hour(s)).             Failed - In person appointment or virtual visit in the past 6 months     Recent Outpatient Visits              7 months ago Chronic right shoulder pain    [address] [facility], Dave Shaw, DO    Office Visit    7 months ago Acute foreign body of left eye, initial encounter    [address], [place], [place], [employer]    Office Visit    1 year ago Lower respiratory infection    [address], [employer], [address], Mark Peterson [employer]    Office Visit    1 year ago Bilateral leg edema    [address] [facility], [address], DO Danni    Office Visit    1 year ago Prostate cancer [facility])    [address] [facility], Quintin Esqueda MD    Office Visit                      Failed - EGFRCR or GFRNAA > 50     GFR Evaluation            Passed - In person appointment in the past 12 or next 3 months     Recent Outpatient Visits              7 months ago Chronic right shoulder pain    [facility], [address], [address], Danni, DO    Office Visit    7 months ago Acute foreign body of left eye, initial encounter    [address], [employer], [address], [place], [place], [employer]    Office Visit    1 year ago Lower respiratory infection    [facility], [address], Mark Peterson [employer]    Office Visit    1 year ago Bilateral leg edema    [facility], [address], [address], Danni, DO    Office Visit    1 year ago Prostate cancer [facility])    [address] [facility], Quintin Esqueda MD    Office Visit                            Recent Outpatient Visits              7 months ago Chronic right shoulder pain    [address] [facility], [address], Danni, DO    Office Visit    7 months ago Acute foreign body of left eye, initial encounter    [address], [address], [address], [place], [place], [employer]    Office Visit    1 year ago Lower respiratory infection    [address], [address], [address], [address]    Office Visit    1 year ago Bilateral leg edema    [address] [facility], [address], Danni, DO    Office Visit    1 year ago Prostate cancer [facility])    [address] [facility], Quintin Esqueda MD    Office Visit

## 2023-11-20 RX ORDER — OLMESARTAN MEDOXOMIL 40 MG/1
40 TABLET ORAL DAILY
Qty: 90 TABLET | Refills: 0 | Status: SHIPPED | OUTPATIENT
Start: 2023-11-20

## 2024-01-22 ENCOUNTER — OFFICE VISIT (OUTPATIENT)
Dept: FAMILY MEDICINE CLINIC | Facility: CLINIC | Age: 64
End: 2024-01-22

## 2024-01-22 VITALS
BODY MASS INDEX: 40.88 KG/M2 | HEART RATE: 79 BPM | DIASTOLIC BLOOD PRESSURE: 98 MMHG | HEIGHT: 69 IN | TEMPERATURE: 97 F | SYSTOLIC BLOOD PRESSURE: 130 MMHG | WEIGHT: 276 LBS

## 2024-01-22 DIAGNOSIS — R04.0 LEFT-SIDED EPISTAXIS: Primary | Chronic | ICD-10-CM

## 2024-01-22 DIAGNOSIS — I10 ESSENTIAL HYPERTENSION: ICD-10-CM

## 2024-01-22 DIAGNOSIS — L29.9 LOCALIZED PRURITUS: Chronic | ICD-10-CM

## 2024-01-22 DIAGNOSIS — L30.9 DERMATITIS: ICD-10-CM

## 2024-01-22 DIAGNOSIS — J45.20 MILD INTERMITTENT ASTHMA WITHOUT COMPLICATION: ICD-10-CM

## 2024-01-22 PROCEDURE — 3075F SYST BP GE 130 - 139MM HG: CPT | Performed by: FAMILY MEDICINE

## 2024-01-22 PROCEDURE — 3080F DIAST BP >= 90 MM HG: CPT | Performed by: FAMILY MEDICINE

## 2024-01-22 PROCEDURE — 3008F BODY MASS INDEX DOCD: CPT | Performed by: FAMILY MEDICINE

## 2024-01-22 PROCEDURE — 99214 OFFICE O/P EST MOD 30 MIN: CPT | Performed by: FAMILY MEDICINE

## 2024-01-22 RX ORDER — BETAMETHASONE DIPROPIONATE 0.5 MG/G
1 CREAM TOPICAL 2 TIMES DAILY
Qty: 50 G | Refills: 0 | Status: SHIPPED | OUTPATIENT
Start: 2024-01-22

## 2024-01-22 RX ORDER — OLMESARTAN MEDOXOMIL AND HYDROCHLOROTHIAZIDE 40/12.5 40; 12.5 MG/1; MG/1
1 TABLET ORAL DAILY
Qty: 90 TABLET | Refills: 1 | Status: SHIPPED | OUTPATIENT
Start: 2024-01-22 | End: 2025-01-16

## 2024-01-22 NOTE — PATIENT INSTRUCTIONS
Get a humidifier for your bedroom and use it while you are asleep to help moisturize the air.  Also you may want to get a little Vaseline and apply it either with your pinky finger or a Q-tip just inside the nares on both sides.  If the bleeding continues please see ENT and I gave you a referral.      Also gave you betamethasone cream for the rash but I think you should see dermatology as the rash has been going on for quite some time now.    Stop the plain olmesartan 40 mg as blood pressure is too high.  I went to start you on a combination pill called olmesartan hydrochlorothiazide 40/12.5 mg instead daily for blood pressure.

## 2024-01-22 NOTE — PROGRESS NOTES
Patient ID: Wes Solorzano is a 63 year old male.    HPI  Chief Complaint   Patient presents with    Epistaxis     At least 3 episodes for the past 3 weeks -     Itchiness     Irritation -mild pain     HTN     Last seen by me on 04/17/2023.    Pt c/o frequent nose bleeds only on the left side. States he has 3 episodes for the past 3 weeks. He does not use a humidifier. I provided a referral to ENT. I discussed using a humidifier at night and applying Vaseline to the inside the nose.  He states that the last time it happened was probably 1 week ago.    He also c/o intermittent itchiness on his left upper back. He has never seen a dermatologist about this. States that the itchiness goes away and then comes back again. He has used betamethasone dipropionate 0.05% cream in the past but ran out quite sometime ago; I provided a prescription for him. I provided a referral to Dr. Carnes, dermatologist.     States that he has mild intermittent asthma and uses albuterol as needed. Pt is taking Olmesartan Medoxomil 40 mg for BP. I discussed trying a new BP medication and he agreed.       Wt Readings from Last 6 Encounters:   01/22/24 276 lb   04/17/23 281 lb   03/27/23 283 lb   10/29/22 268 lb 9.6 oz   07/26/22 277 lb   01/26/22 267 lb       BMI Readings from Last 6 Encounters:   01/22/24 40.76 kg/m²   04/17/23 41.50 kg/m²   03/27/23 41.79 kg/m²   10/29/22 38.54 kg/m²   07/26/22 40.91 kg/m²   01/26/22 38.31 kg/m²       BP Readings from Last 6 Encounters:   01/22/24 (!) 130/98   04/17/23 142/87   03/27/23 (!) 158/104   03/27/23 (!) 149/104   10/29/22 (!) 138/96   07/26/22 131/85         Review of Systems   Respiratory:  Negative for shortness of breath.    Cardiovascular:  Negative for chest pain.           Medical History:      Past Medical History:   Diagnosis Date    Asthma     Degenerative joint disease (DJD) of lumbar spine     DJD (degenerative joint disease) of pelvis     High blood pressure     Obesity     MAHESH  (obstructive sleep apnea) 11/24/2020    Pneumonia 2008    Prostate cancer (HCC) 09/01/2020    [place] 3+3=6 1/12 cores, 10% involvement.     Visual impairment     reading glasses       Past Surgical History:   Procedure Laterality Date    AMPUTATION FINGER/THUMB Left 2010    table saw injury     BIOPSY OF PROSTATE,NEEDLE/PUNCH Bilateral 09/01/2020    Dr. Melgar    LAPAROSCOPY, SURGICAL PROSTATECTOMY, RETROPUBIC RADICAL, W/NERVE SPARING  11/24/2020    RALP. Dr. Quiroz @ [facility]          Current Outpatient Medications   Medication Sig Dispense Refill    albuterol (PROAIR HFA) 108 (90 Base) MCG/ACT Inhalation Aero Soln Inhale 2 puffs into the lungs every 4 (four) hours as needed for Wheezing or Shortness of Breath. 3 each 1    Betamethasone Dipropionate Aug (DIPROLENE AF) 0.05 % External Cream Apply 1 Application topically 2 (two) times daily. As needed.  Do not use long-term. 50 g 0    Olmesartan Medoxomil 40 MG Oral Tab Take 1 tablet (40 mg total) by mouth daily. For blood pressure 90 tablet 0    celecoxib (CELEBREX) 200 MG Oral Cap Take 1 capsule (200 mg total) by mouth daily. Take with food. This is for pain and inflammation. (Patient not taking: Reported on 1/22/2024) 90 capsule 0     Allergies:  Allergies   Allergen Reactions    Amoxicillin RASH     Ok to give Ancef despite allergy to Amoxicillin Per Dr Quiroz        Physical Exam:       Physical Exam  Blood pressure (!) 142/104, pulse 79, temperature 96.7 °F (35.9 °C), height 5' 9\" (1.753 m), weight 276 lb.    Vitals:    01/22/24 1506 01/22/24 1521   BP: (!) 142/104 (!) 130/98   Pulse: 79    Temp: 96.7 °F (35.9 °C)    Weight: 276 lb    Height: 5' 9\" (1.753 m)          Physical Exam   Constitutional: Patient is oriented to person, place, and time. Patient appears well-developed and well-nourished. No distress.   Head: Normocephalic.   Right Ear: Tympanic membrane, external ear and ear canal normal.   Left Ear: Tympanic membrane, external ear and ear canal normal.    Nose: No mucosal edema or rhinorrhea. Left naris without any scabs, abrasions or mass TRAV.  No abnormality seen on the right as well.  Throat: Oropharynx is clear without exudate   Eyes: Conjunctivae and EOM are normal.   Neck: Normal range of motion. No thyromegaly present.   Cardiovascular: Normal rate, regular rhythm and normal heart sounds.    Pulmonary/Chest: Effort normal and breath sounds normal. No respiratory distress.   Lymphadenopathy: Patient has no cervical adenopathy.  Neurological: Patient is alert and oriented to person, place, and time.   Skin: Skin is warm. Numerous, raised red bumps on the left scapular area. He has the same lesions on the lower back TRAV but worse on the left than the right.  This is not shingles or vesicles.  They are not tender to the touch.  They are not infected.  There is no pustules.  Psychiatry: Normal mood and affect.    Vitals reviewed.           Assessment/Plan:      Diagnoses and all orders for this visit:    Left-sided epistaxis  -     ENT - INTERNAL  See patient instructions  Dermatitis  -     DERM - INTERNAL  -     Betamethasone Dipropionate Aug (DIPROLENE AF) 0.05 % External Cream; Apply 1 Application  topically 2 (two) times daily. As needed.  Do not use long-term.    Localized pruritus  -     DERM - INTERNAL  -     Betamethasone Dipropionate Aug (DIPROLENE AF) 0.05 % External Cream; Apply 1 Application  topically 2 (two) times daily. As needed.  Do not use long-term.    Essential hypertension  -     Olmesartan Medoxomil-HCTZ 40-12.5 MG Oral Tab; Take 1 tablet by mouth daily. For blood pressure.    Mild intermittent asthma without complication  He rarely needs the albuterol inhaler.      Referrals (if applicable)  Orders Placed This Encounter   Procedures    DERM - INTERNAL     If he is busy, you may see one of his partners.     Referral Priority:   Routine     Referral Type:   OFFICE VISIT     Referred to Provider:   Jose Carnes MD     Requested Specialty:    DERMATOLOGY     Number of Visits Requested:   3    ENT - INTERNAL     At the [place] office and at the [facility].     Referral Priority:   Routine     Referral Type:   OFFICE VISIT     Referred to Provider:   Denis Abdi MD     Requested Specialty:   OTOLARYNGOLOGY     Number of Visits Requested:   3       Follow up if symptoms persist.  Take medicine (if given) as prescribed.  Approach to treatment discussed and patient/family member understands and agrees to plan.     Return in about 6 weeks (around 3/4/2024) for Physical Exam.    Patient Instructions   Get a humidifier for your bedroom and use it while you are asleep to help moisturize the air.  Also you may want to get a little Vaseline and apply it either with your pinky finger or a Q-tip just inside the nares on both sides.  If the bleeding continues please see ENT and I gave you a referral.      Also gave you betamethasone cream for the rash but I think you should see dermatology as the rash has been going on for quite some time now.    Stop the plain olmesartan 40 mg as blood pressure is too high.  I went to start you on a combination pill called olmesartan hydrochlorothiazide 40/12.5 mg instead daily for blood pressure.    Randy Cross    1/22/2024    By signing my name below, IRandy,  attest that this documentation has been prepared under the direction and in the presence of Oumar Nunez DO.   Electronically Signed: Randy Cross, 1/22/2024, 3:06 PM.      I, Oumar Nunez DO,  personally performed the services described in this documentation. All medical record entries made by the scribe were at my direction and in my presence.  I have reviewed the chart and discharge instructions (if applicable) and agree that the record reflects my personal performance and is accurate and complete.  Oumar Nunez DO, 1/22/2024, 4:57 PM

## 2024-02-28 ENCOUNTER — TELEPHONE (OUTPATIENT)
Dept: FAMILY MEDICINE CLINIC | Facility: CLINIC | Age: 64
End: 2024-02-28

## 2024-03-27 ENCOUNTER — TELEPHONE (OUTPATIENT)
Dept: FAMILY MEDICINE CLINIC | Facility: CLINIC | Age: 64
End: 2024-03-27

## 2024-05-20 DIAGNOSIS — I10 ESSENTIAL HYPERTENSION: ICD-10-CM

## 2024-05-20 NOTE — TELEPHONE ENCOUNTER
Pharmacy requesting refill     Olmesartan Medoxomil-HCTZ 40-12.5 MG Oral Tab Take 1 tablet by mouth daily. For blood pressure. 90 tablet 1

## 2024-05-22 NOTE — TELEPHONE ENCOUNTER
Please review. Protocol Failed; No Protocol    Requested Prescriptions   Pending Prescriptions Disp Refills    Olmesartan Medoxomil-HCTZ 40-12.5 MG Oral Tab 90 tablet 1     Sig: Take 1 tablet by mouth daily. For blood pressure.       Hypertension Medications Protocol Failed - 5/20/2024 11:23 AM        Failed - CMP or BMP in past 12 months        Failed - Last BP reading less than 140/90     BP Readings from Last 1 Encounters:   01/22/24 (!) 130/98               Failed - EGFRCR or GFRNAA > 50     GFR Evaluation            Passed - In person appointment or virtual visit in the past 12 mos or appointment in next 3 mos     Recent Outpatient Visits              4 months ago Left-sided epistaxis    [facility], [address], Oumar Ring,     Office Visit    1 year ago Chronic right shoulder pain    [facility], [address], Oumar Ring,     Office Visit    1 year ago Acute foreign body of left eye, initial encounter    [facility], Walk-In [facility], Shawna Caro PA-C    Office Visit    1 year ago Lower respiratory infection    [facility], [facility], Ignacia Noel APN    Office Visit    1 year ago Bilateral leg edema    [facility], Oumar Ring,     Office Visit                               Recent Outpatient Visits              4 months ago Left-sided epistaxis    [facility], [address], Oumar Ring,     Office Visit    1 year ago Chronic right shoulder pain    [facility], Oumar Ring DO    Office Visit    1 year ago Acute foreign body of left eye, initial encounter    [facility], Walk-In [facility], [facility], Shawna Caro PA-C    Office Visit    1 year ago Lower respiratory infection    [facility],  Walk-In Clinic, [facility], Ignacia Noel APN    Office Visit    1 year ago Bilateral leg edema    [facility], [address], Oumar Ring DO    Office Visit

## 2024-05-23 RX ORDER — OLMESARTAN MEDOXOMIL AND HYDROCHLOROTHIAZIDE 40/12.5 40; 12.5 MG/1; MG/1
1 TABLET ORAL DAILY
Qty: 90 TABLET | Refills: 0 | Status: SHIPPED | OUTPATIENT
Start: 2024-05-23 | End: 2024-08-07

## 2024-07-12 ENCOUNTER — HOSPITAL ENCOUNTER (EMERGENCY)
Facility: HOSPITAL | Age: 64
Discharge: HOME | End: 2024-07-12
Attending: EMERGENCY MEDICINE
Payer: COMMERCIAL

## 2024-07-12 ENCOUNTER — APPOINTMENT (OUTPATIENT)
Dept: GENERAL RADIOLOGY | Facility: HOSPITAL | Age: 64
End: 2024-07-12
Attending: EMERGENCY MEDICINE
Payer: COMMERCIAL

## 2024-07-12 VITALS
DIASTOLIC BLOOD PRESSURE: 99 MMHG | TEMPERATURE: 99 F | OXYGEN SATURATION: 99 % | SYSTOLIC BLOOD PRESSURE: 138 MMHG | HEART RATE: 89 BPM | RESPIRATION RATE: 16 BRPM

## 2024-07-12 DIAGNOSIS — S61.210A LACERATION OF RIGHT INDEX FINGER WITHOUT FOREIGN BODY WITHOUT DAMAGE TO NAIL, INITIAL ENCOUNTER: Primary | ICD-10-CM

## 2024-07-12 DIAGNOSIS — T14.8XXA AVULSION FRACTURE: ICD-10-CM

## 2024-07-12 PROCEDURE — 12001 RPR S/N/AX/GEN/TRNK 2.5CM/<: CPT

## 2024-07-12 PROCEDURE — 99284 EMERGENCY DEPT VISIT MOD MDM: CPT

## 2024-07-12 PROCEDURE — 73140 X-RAY EXAM OF FINGER(S): CPT | Performed by: EMERGENCY MEDICINE

## 2024-07-12 RX ORDER — CEPHALEXIN 500 MG/1
500 CAPSULE ORAL 4 TIMES DAILY
Qty: 28 CAPSULE | Refills: 0 | Status: SHIPPED | OUTPATIENT
Start: 2024-07-12 | End: 2024-07-19

## 2024-07-12 RX ORDER — CEPHALEXIN 500 MG/1
500 CAPSULE ORAL ONCE
Status: COMPLETED | OUTPATIENT
Start: 2024-07-12 | End: 2024-07-12

## 2024-07-12 RX ORDER — LIDOCAINE HYDROCHLORIDE 10 MG/ML
20 INJECTION, SOLUTION EPIDURAL; INFILTRATION; INTRACAUDAL; PERINEURAL ONCE
Status: COMPLETED | OUTPATIENT
Start: 2024-07-12 | End: 2024-07-12

## 2024-07-12 NOTE — ED INITIAL ASSESSMENT (HPI)
S: pt presents to ED with c/o lacerations to RH2D. Pt states that he got it caught in the caliper while fixing the car. Last tetanus 2020. Occurred at 1130 this am.

## 2024-07-13 NOTE — ED PROVIDER NOTES
Patient Seen in: [facility] Emergency Department    History     Chief Complaint   Patient presents with    Laceration     Stated Complaint: Finger injury    HPI    HPI: Wes Solorzano is a 64 year old male hx of asthma, DJD, HTN who presents after an injury to the right index finger that occurred just prior to arrival while pt was working on a car and accidentally got his finger stuck in the wheel caliper near the rim . C/o laceration to the area. Patient complains of constant, moderate to severe pain to the finger. Denies numbness or tingling. Sts TDAP updated in 2020.     Past Medical History:    Asthma (HCC)    Degenerative joint disease (DJD) of lumbar spine    DJD (degenerative joint disease) of pelvis    High blood pressure    Obesity    MAHESH (obstructive sleep apnea)    Pneumonia    Prostate cancer (HCC)    [place] 3+3=6 1/12 cores, 10% involvement.     Visual impairment    reading glasses       Past Surgical History:   Procedure Laterality Date    Amputation finger/thumb Left 2010    table saw injury     Biopsy of prostate,needle/punch Bilateral 09/01/2020    Dr. Melgar    Laparoscopy, surgical prostatectomy, retropubic radical, w/nerve sparing  11/24/2020    RALP. Dr. Quiroz @ [facility]            Family History   Problem Relation Age of Onset    Diabetes Mother     Hypertension Mother     Hypertension Father     Prostate Cancer Father        Social History     Socioeconomic History    Marital status:     Number of children: 4   Occupational History    Occupation: Law enforcement     Employer: [employer]   Tobacco Use    Smoking status: Never    Smokeless tobacco: Never   Vaping Use    Vaping status: Never Used   Substance and Sexual Activity    Alcohol use: Not Currently    Drug use: No   Other Topics Concern    Caffeine Concern Yes     Comment: coffee, soda, 2 cups daily       Review of Systems    Positive for stated complaint: Finger injury  Other systems are as noted in  HPI.  Constitutional and vital signs reviewed.      All other systems reviewed and negative except as noted above.    PSFH elements reviewed from today and agreed except as otherwise stated in HPI.    Physical Exam     ED Triage Vitals   BP 07/12/24 1901 (!) 151/98   Pulse 07/12/24 1901 64   Resp 07/12/24 1901 20   Temp 07/12/24 1901 98.6 °F (37 °C)   Temp src --    SpO2 07/12/24 1901 95 %   O2 Device 07/12/24 2244 None (Room air)       Current:BP (!) 138/99   Pulse 89   Temp 98.6 °F (37 °C)   Resp 16   SpO2 99%         Physical Exam      MENTAL STATUS: Alert, oriented, and cooperative. No focal deficit  HEAD: Atraumatic  NECK: Supple, full range of motion without pain or paresthesias  EXTREMITIES: see skin exam, laceration present to distal finger pad of r second digit with mild soft tissue swelling. Pt able to flex R MCP, PIP and DIP joints and fully extend joints. No other lacerations to right hand or wrist, FROM of all other digits of R hand .  2+ radial pulses bilaterally.  NEURO:Sensation to touch is intact throughout R finger and other fingers of R hand  SKIN: stellate laceration as below in image to R 2nd digit. No active bleeding. Not visibly contaminated. Cap refill <2 seconds in distal finger tip though 2-3 seconds to skin flap over laceration  PSYCH: Normal affect. Calm and cooperative.        ED Course   Labs Reviewed - No data to display      Laceration repair:    Verbal consent was obtained from the patient.  The stellate 2.5 cm laceration located to the right index finger was anesthetized in the usual fashion with right index finger block using 15 mL of 1% lidocaine without epinephrine. Anesthesia was achieved. The wound was scrubbed, draped and explored.   There were no deep structures involved.  no foreign body visualized. No tendon injury was identified.  The wound was repaired with 15 5-0 nylon sutures in simple interrupted fashion.  The wound repair was simple.  The procedure was performed  by myself.    [facility]     Radiology:    @XR FINGER(S) (MIN 2 VIEWS), RIGHT 2ND (CPT=73140)    Result Date: 7/12/2024  CONCLUSION:  1. Tiny ossific fragments adjacent to the distal interphalangeal joint of the right 2nd digit (index/pointing finger) of unknown chronicity; correlation for point tenderness may be of benefit to ascertain for potential avulsion injury.  2. Extensive soft tissue swelling and cutaneous injury.    Dictated by (CST): Galdino Gordillo MD on 7/12/2024 at 8:27 PM     Finalized by (CST): Galdino Gordillo MD on 7/12/2024 at 8:29 PM           64M hx as above presents wifh injury to right index finger with laceration. Bleeding controlled. Slight delay cap refill to part of skin flap on complex appearing laceration however other portions of finger with intact cap refill, ROM intact, pt able to sense light touch.   Ddx: finger fracture, finger laceration, less likely tendinous injury, retained foreign body    ED Course as of 07/13/24 0248  ------------------------------------------------------------  Time: 07/12 2023  Value: XR FINGER(S) (MIN 2 VIEWS), RIGHT 2ND (CPT=73140)  Comment: Poss tiny radiopaque foreign body   ------------------------------------------------------------  Time: 07/12 2054  Value: XR FINGER(S) (MIN 2 VIEWS), RIGHT 2ND (CPT=73140)  Comment: CONCLUSION:   1. Tiny ossific fragments adjacent to the distal interphalangeal joint of the right 2nd digit (index/pointing finger) of unknown chronicity; correlation for point tenderness may be of benefit to ascertain for potential avulsion injury.      2. Extensive soft tissue swelling and cutaneous injury.     Pt updated with results. Laceration repair completed pt tolerated well. Will give rx for cephalexin, pt tolerated in ED without reaction. Finger splint placed in context of tiny likely avulsion injury on XR. Counseled on hand surgery f/u I discussed case with Dr. Elliott recommends outpatient follow up in their clinic within 7 days.  Counseled pt to return immediately with signs of infection, fever, wound dehiscence, color change, numbness or any other new or worsening signs/sx. He verbalized understanding comfortable with DC plan.            Disposition and Plan     Clinical Impression:  1. Laceration of right index finger without foreign body without damage to nail, initial encounter    2. Avulsion fracture        Disposition:  Discharge    Follow-up:  Oumar Nunez DO  [address]  [facility] [address]  [phone]    Schedule an appointment as soon as possible for a visit in 3 day(s)      Asim Elliott MD  [address]  [facility] [address] [phone]    Schedule an appointment as soon as possible for a visit in 3 day(s)  Call Monday to schedule follow up.    [facility] Emergency Department  [address]  [facility] [address]  [phone]  Go to  If symptoms worsen, immediately      Medications Prescribed:  Discharge Medication List as of 7/12/2024 10:44 PM        START taking these medications    Details   cephalexin 500 MG Oral Cap Take 1 capsule (500 mg total) by mouth 4 (four) times daily for 7 days., Normal, Disp-28 capsule, R-0             Mary Ordaz DO  Attending Physician  Emergency Medicine

## 2024-07-26 ENCOUNTER — OFFICE VISIT (OUTPATIENT)
Dept: FAMILY MEDICINE CLINIC | Facility: CLINIC | Age: 64
End: 2024-07-26
Payer: COMMERCIAL

## 2024-07-26 VITALS
HEART RATE: 96 BPM | OXYGEN SATURATION: 98 % | TEMPERATURE: 99 F | SYSTOLIC BLOOD PRESSURE: 116 MMHG | DIASTOLIC BLOOD PRESSURE: 82 MMHG | RESPIRATION RATE: 16 BRPM

## 2024-07-26 DIAGNOSIS — Z48.02 VISIT FOR SUTURE REMOVAL: Primary | ICD-10-CM

## 2024-07-26 PROCEDURE — 3079F DIAST BP 80-89 MM HG: CPT | Performed by: NURSE PRACTITIONER

## 2024-07-26 PROCEDURE — 3074F SYST BP LT 130 MM HG: CPT | Performed by: NURSE PRACTITIONER

## 2024-07-26 NOTE — PROGRESS NOTES
Chief complaint: Suture removal to laceration of right index finger.    HPI: Patient received sutures on 7/12/24 at [facility] ED for laceration.  It has been 2 weeks since sutures were placed. No drainage, signs of infection, fever or any problems with the wound.  ROM is nearly back to normal.  Sensation intact.    Physical exam:    Skin: wound clean, no drainage, sutures intact, no signs of infection, no inflammation, no redness, no increase in pain, no increase in warmth to wound or surrounding wound bed.       PROCEDURE: suture removal  LOCATION: Right index finger  WOUND EXAM: Appears well healed, well approximated, no signs of infection.  Wound dehiscence- none.  15 sutures removed without difficulty.  DRESSING: Steri strips applied  COMPLICATIONS: no complications  PATIENT STATUS: tolerated very well.    Re-iterated for pt to follow up with PCP/hand specialty as recommended by ED.  Return to office if any new signs of infection such as redness, discharge, swelling, or increasing pain.  Pt verbalizes understanding.  CEDRIC Escalera-BC

## 2024-08-07 DIAGNOSIS — I10 ESSENTIAL HYPERTENSION: ICD-10-CM

## 2024-08-12 RX ORDER — OLMESARTAN MEDOXOMIL AND HYDROCHLOROTHIAZIDE 40/12.5 40; 12.5 MG/1; MG/1
1 TABLET ORAL DAILY
Qty: 90 TABLET | Refills: 0 | Status: SHIPPED | OUTPATIENT
Start: 2024-08-12 | End: 2024-11-10

## 2024-08-12 NOTE — TELEPHONE ENCOUNTER
Please review. Protocol Failed; No Protocol    Requested Prescriptions   Pending Prescriptions Disp Refills    Olmesartan Medoxomil-HCTZ 40-12.5 MG Oral Tab 90 tablet 0     Sig: Take 1 tablet by mouth daily. For blood pressure.       Hypertension Medications Protocol Failed - 8/7/2024 11:18 PM        Failed - CMP or BMP in past 12 months        Failed - EGFRCR or GFRNAA > 50     GFR Evaluation            Passed - Last BP reading less than 140/90     BP Readings from Last 1 Encounters:   07/26/24 116/82               Passed - In person appointment or virtual visit in the past 12 mos or appointment in next 3 mos     Recent Outpatient Visits              2 weeks ago Visit for suture removal    [facility], [facility], Ignacia Noel APN    Office Visit    6 months ago Left-sided epistaxis    [facility], Oumar Ring,     Office Visit    1 year ago Chronic right shoulder pain    [facility], Oumar Ring,     Office Visit    1 year ago Acute foreign body of left eye, initial encounter    [facility], Walk[facility]In [facility], [facility], Shawna Caro PA-C    Office Visit    1 year ago Lower respiratory infection    [facility], Ignacia Noel APN    Office Visit                               Recent Outpatient Visits              2 weeks ago Visit for suture removal    [facility], Ignacia Noel APN    Office Visit    6 months ago Left-sided epistaxis    [facility], Oumar Ring DO    Office Visit    1 year ago Chronic right shoulder pain    [facility], Oumar Ring,     Office Visit    1 year ago Acute foreign body of left eye, initial encounter    [facility]  [facility], Walk-In Clinic, [facility], Shawna Caro PA-C    Office Visit    1 year ago Lower respiratory infection    [facility], Walk-In [facility], [facility], Ignacia Noel APN    Office Visit

## 2024-11-09 ENCOUNTER — TELEPHONE (OUTPATIENT)
Dept: FAMILY MEDICINE CLINIC | Facility: CLINIC | Age: 64
End: 2024-11-09

## 2024-11-09 DIAGNOSIS — I10 ESSENTIAL HYPERTENSION: ICD-10-CM

## 2024-11-13 RX ORDER — OLMESARTAN MEDOXOMIL AND HYDROCHLOROTHIAZIDE 40/12.5 40; 12.5 MG/1; MG/1
1 TABLET ORAL DAILY
Qty: 30 TABLET | Refills: 0 | Status: SHIPPED | OUTPATIENT
Start: 2024-11-13 | End: 2024-12-16

## 2024-11-13 NOTE — TELEPHONE ENCOUNTER
Please review; protocol failed - patient has yet to schedule an office visit. Please advise if patient needs to be seen first or if refill for 30 days is appropriate.    Per last refill notes: Sig: Take 1 tablet by mouth daily. Pt due for physical  and labs-no refills    MyChart message sent to patient to schedule an office visit with PCP.   Routed to Call Center to call patient and make an appointment.    No future appointments.  Last office visit: 1/22/2024    Requested Prescriptions   Pending Prescriptions Disp Refills    OLMESARTAN MEDOXOMIL-HCTZ 40-12.5 MG Oral Tab [Pharmacy Med Name: OLMESARTAN-HCTZ 40-12.5 MG TAB] 90 tablet 0     Sig: Take 1 tablet by mouth daily. Pt due for physical  and labs-no refills       Hypertension Medications Protocol Failed - 11/13/2024 10:52 AM        Failed - CMP or BMP in past 12 months        Failed - EGFRCR or GFRNAA > 50     GFR Evaluation            Passed - Last BP reading less than 140/90     BP Readings from Last 1 Encounters:   07/26/24 116/82               Passed - In person appointment or virtual visit in the past 12 mos or appointment in next 3 mos     Recent Outpatient Visits              3 months ago Visit for suture removal    [facility], Walk-In [facility], Ignacia Noel APN    Office Visit    9 months ago Left-sided epistaxis    [facility], Oumar Ring DO    Office Visit    1 year ago [facility] right shoulder pain    [facility]    Office Visit    1 year ago Acute foreign body of left eye, initial encounter    [facility], Walk[facility]In [facility], [facility], Shawna Caro PA-C    Office Visit    2 years ago Lower respiratory infection    [facility], Walk[facility]In [facility], Ignacia Noel APN    Office Visit                             Recent Outpatient Visits               3 months ago Visit for suture removal    [facility], Walk-In [facility], Ignacia Noel APN    Office Visit    9 months ago Left-sided epistaxis    [facility], [address], Oumar Ring,     Office Visit    1 year ago Chronic right shoulder pain    [facility], [address], Oumar Ring,     Office Visit    1 year ago Acute foreign body of left eye, initial encounter    [facility], [facility], [facility], Shawna Caro PA-C    Office Visit    2 years ago Lower respiratory infection    [facility], [facility]In [facility], Ignacia Noel APN    Office Visit

## 2024-11-13 NOTE — TELEPHONE ENCOUNTER
Please call patient to assist in making an annual physical exam. Patient is overdue and need to schedule for further refills. Thank you      Last office visit: 1/22/2024    A [employer] message has been sent to patient, even though patient has not read their [employer] message since 11/17/2023. Last logged in 8/7/24.      **Please attempt all available phone numbers in patient's chart. This includes alternative numbers and contacts on patient's release of information. Thank you.

## 2024-12-07 DIAGNOSIS — I10 ESSENTIAL HYPERTENSION: ICD-10-CM

## 2024-12-12 NOTE — TELEPHONE ENCOUNTER
Please review; protocol failed/ has no protocol    Message sent for patient to make an appointment.     Requested Prescriptions   Pending Prescriptions Disp Refills    OLMESARTAN MEDOXOMIL-HCTZ 40-12.5 MG Oral Tab [Pharmacy Med Name: OLMESARTAN-HCTZ 40-12.5 MG TAB] 30 tablet 0     Sig: Take 1 tablet by mouth daily. **30 days only - Overdue for physical and labs - no further refills       Hypertension Medications Protocol Failed - 12/12/2024  7:23 AM        Failed - CMP or BMP in past 12 months        Failed - EGFRCR or GFRNAA > 50     GFR Evaluation            Passed - Last BP reading less than 140/90     BP Readings from Last 1 Encounters:   07/26/24 116/82               Passed - In person appointment or virtual visit in the past 12 mos or appointment in next 3 mos     Recent Outpatient Visits              4 months ago Visit for suture removal    [facility], [facility], Ignacia Noel APN    Office Visit    10 months ago Left-sided epistaxis    [facility], Oumar Ring DO    Office Visit    1 year ago Chronic right shoulder pain    [facility], Oumar Ring DO    Office Visit    1 year ago Acute foreign body of left eye, initial encounter    [facility], [facility], [facility], Shawna Caro PA-C    Office Visit    2 years ago Lower respiratory infection    [facility], [facility], Ignacia Noel APN    Office Visit                         Recent Outpatient Visits              4 months ago Visit for suture removal    [facility], [facility], Ignacia Noel APN    Office Visit    10 months ago Left-sided epistaxis    [facility], Oumar Ring DO    Office Visit    1 year ago Chronic right shoulder pain    [facility] [address], Oumra Ring DO    Office Visit    1 year ago Acute foreign body of left eye, initial encounter    [facility], [facility], [facility], Shawna Caro PA-C    Office Visit    2 years ago Lower respiratory infection    [facility], [facility], [facility], Ignacia Noel APN    Office Visit

## 2024-12-16 RX ORDER — OLMESARTAN MEDOXOMIL AND HYDROCHLOROTHIAZIDE 40/12.5 40; 12.5 MG/1; MG/1
1 TABLET ORAL DAILY
Qty: 30 TABLET | Refills: 0 | Status: SHIPPED | OUTPATIENT
Start: 2024-12-16 | End: 2025-01-15

## 2025-01-18 DIAGNOSIS — I10 ESSENTIAL HYPERTENSION: ICD-10-CM

## 2025-01-22 RX ORDER — OLMESARTAN MEDOXOMIL AND HYDROCHLOROTHIAZIDE 40/12.5 40; 12.5 MG/1; MG/1
1 TABLET ORAL DAILY
Qty: 30 TABLET | Refills: 0 | OUTPATIENT
Start: 2025-01-22 | End: 2025-02-21

## 2025-04-04 NOTE — TELEPHONE ENCOUNTER
Alternative requested: Olmesartan medoxomil 40MG tab, Irbesartan 300MG tablet      Current Outpatient Medications:   •    •  losartan 100 MG Oral Tab, Take 1 tablet (100 mg total) by mouth once daily. , Disp: 90 tablet, Rfl: 3 Spoke with patient updating that Dr Muse is ok with using last OV as Pre op as labs were also done at that time. Order received for EKG. Informed patient she can walk in and hours are until 4pm Monday thru Friday. Patient expressed understanding and is agreeable.

## 2025-07-18 ENCOUNTER — OFFICE VISIT (OUTPATIENT)
Dept: FAMILY MEDICINE CLINIC | Facility: CLINIC | Age: 65
End: 2025-07-18

## 2025-07-18 VITALS
HEIGHT: 69 IN | DIASTOLIC BLOOD PRESSURE: 88 MMHG | SYSTOLIC BLOOD PRESSURE: 138 MMHG | BODY MASS INDEX: 39.99 KG/M2 | WEIGHT: 270 LBS | HEART RATE: 76 BPM

## 2025-07-18 DIAGNOSIS — I10 ESSENTIAL HYPERTENSION: Primary | ICD-10-CM

## 2025-07-18 PROCEDURE — 99213 OFFICE O/P EST LOW 20 MIN: CPT | Performed by: FAMILY MEDICINE

## 2025-07-18 RX ORDER — OLMESARTAN MEDOXOMIL AND HYDROCHLOROTHIAZIDE 40/12.5 40; 12.5 MG/1; MG/1
1 TABLET ORAL DAILY
Qty: 90 TABLET | Refills: 3 | Status: SHIPPED | OUTPATIENT
Start: 2025-07-18 | End: 2025-07-18

## 2025-07-18 RX ORDER — OLMESARTAN MEDOXOMIL AND HYDROCHLOROTHIAZIDE 40/12.5 40; 12.5 MG/1; MG/1
1 TABLET ORAL DAILY
COMMUNITY
End: 2025-07-18

## 2025-07-18 RX ORDER — OLMESARTAN MEDOXOMIL AND HYDROCHLOROTHIAZIDE 40/12.5 40; 12.5 MG/1; MG/1
1 TABLET ORAL DAILY
Qty: 90 TABLET | Refills: 3 | Status: SHIPPED | OUTPATIENT
Start: 2025-07-18

## 2025-07-18 NOTE — PROGRESS NOTES
Chief Complaint   Patient presents with    Medication Follow-Up     HPI:   Wes Solorzano is a 65 year old male who presents to clinic for medication refill.  Patient has history of essential hypertension and takes Benicar.  Due to issue with his detention/insurance he does not currently have active medical insurance.  Does monitor blood pressure at home, currently controlled on the Benicar.  No complaints of chest pain shortness of breath dizziness or palpitations.    REVIEW OF SYSTEMS:   Negative, except per HPI.     EXAM:   BP (!) 138/97 (BP Location: Left arm, Patient Position: Sitting, Cuff Size: large)   Pulse 76   Ht 5' 9\" (1.753 m)   Wt 270 lb (122.5 kg)   BMI 39.87 kg/m²   Body mass index is 39.87 kg/m².  GENERAL: well developed, well nourished, in no apparent distress  SKIN: no rashes, no suspicious lesions  HEENT: atraumatic, normocephalic  EYES: PERRLA, EOMI,conjunctiva are clear  NECK: supple, no adenopathy    ASSESSMENT AND PLAN:     Essential hypertension  - Stable condition, continue present management.    - Olmesartan Medoxomil-HCTZ 40-12.5 MG Oral Tab; Take 1 tablet by mouth daily.  Dispense: 90 tablet; Refill: 3       RTC if no improvement in symptoms. Red flags discussed to go to [facility].     Magdalena Thornton MD  7/18/2025  2:09 PM

## (undated) DEVICE — DRAIN RESERVOIR RELIAVAC 100CC

## (undated) DEVICE — SUTURE MONOCRYL 4-0 PS-2

## (undated) DEVICE — DRESSING BIOPATCH 1X4 CNTR

## (undated) DEVICE — MARYLAND BIPOLAR FORCEPS: Brand: ENDOWRIST

## (undated) DEVICE — GAMMEX® PI HYBRID SIZE 7.5, STERILE POWDER-FREE SURGICAL GLOVE, POLYISOPRENE AND NEOPRENE BLEND: Brand: GAMMEX

## (undated) DEVICE — STAPLER INTNL 26CMX4MM X

## (undated) DEVICE — TROCAR: Brand: KII FIOS FIRST ENTRY

## (undated) DEVICE — DRAPE SHEET LAPCHOLE 124X100X7

## (undated) DEVICE — PROGRASP FORCEPS: Brand: ENDOWRIST

## (undated) DEVICE — SUTURE SILK 2-0 FS

## (undated) DEVICE — GAMMEX® PI HYBRID SIZE 8, STERILE POWDER-FREE SURGICAL GLOVE, POLYISOPRENE AND NEOPRENE BLEND: Brand: GAMMEX

## (undated) DEVICE — ABSORBABLE HEMOSTAT (OXIDIZED REGENERATED CELLULOSE, U.S.P.): Brand: SURGICEL

## (undated) DEVICE — TRAY SKIN PREP PVP-1

## (undated) DEVICE — DRAPE SRG 26X15IN UTL TPE STRL

## (undated) DEVICE — 3M™ TEGADERM™ TRANSPARENT FILM DRESSING, 1626W, 4 IN X 4-3/4 IN (10 CM X 12 CM), 50 EACH/CARTON, 4 CARTON/CASE: Brand: 3M™ TEGADERM™

## (undated) DEVICE — BLADELESS OBTURATOR: Brand: WECK VISTA

## (undated) DEVICE — SPCMN DTCHBLE POUCH 5X7 500ML

## (undated) DEVICE — SUTURE VICRYL 0

## (undated) DEVICE — VIOLET BRAIDED (POLYGLACTIN 910), SYNTHETIC ABSORBABLE SUTURE: Brand: COATED VICRYL

## (undated) DEVICE — LARGE HEM-O-LOK CLIP APPLIER: Brand: ENDOWRIST

## (undated) DEVICE — STERILE SURGICAL LUBRICANT, METAL TUBE: Brand: SURGILUBE

## (undated) DEVICE — ELECTRO LUBE IS A SINGLE PATIENT USE DEVICE THAT IS INTENDED TO BE USED ON ELECTROSURGICAL ELECTRODES TO REDUCE STICKING.: Brand: KEY SURGICAL ELECTRO LUBE

## (undated) DEVICE — Device

## (undated) DEVICE — DERMABOND LIQUID ADHESIVE

## (undated) DEVICE — SUTURE VICRYL 0 UR-6

## (undated) DEVICE — COLUMN DRAPE

## (undated) DEVICE — SOL H2O 1000ML BTL

## (undated) DEVICE — FENESTRATED BIPOLAR FORCEPS: Brand: ENDOWRIST

## (undated) DEVICE — 2HRM17 2-0 UMND 16X16 13MM LDR: Brand: 2HRM17 2-0 UMND 16X16 13MM LDR

## (undated) DEVICE — SUTURE VICRYL 3-0 SH

## (undated) DEVICE — CLIP HEMOLOK LARGE PURPLE

## (undated) DEVICE — DEV CLOS VLOC 2/0 V 9 V-30

## (undated) DEVICE — BAG DRAIN INFECTION CNTRL 2000

## (undated) DEVICE — ROBOTIC: Brand: MEDLINE INDUSTRIES, INC.

## (undated) DEVICE — LARGE NEEDLE DRIVER: Brand: ENDOWRIST

## (undated) DEVICE — 3M™ STERI-DRAPE™ PATIENT ISOLATION DRAPE 1014: Brand: STERI-DRAPE™

## (undated) DEVICE — SUTURE VICRYL 0 J340H

## (undated) DEVICE — TISSUE RETRIEVAL SYSTEM: Brand: INZII RETRIEVAL SYSTEM

## (undated) DEVICE — TRI-LUMEN FILTERED TUBE SET WITH ACTIVATED CHARCOAL FILTER: Brand: AIRSEAL

## (undated) DEVICE — LAPAROVUE VISIBILITY SYSTEM LAPAROSCOPIC SOLUTIONS: Brand: LAPAROVUE

## (undated) DEVICE — CLIP HEMOLOK MEDIUM GREEN

## (undated) DEVICE — SUCTION CANISTER, 3000CC,SAFELINER: Brand: DEROYAL

## (undated) DEVICE — TIP COVER ACCESSORY

## (undated) DEVICE — RELOAD STPLR 45MM EGIA 3-STPL

## (undated) DEVICE — MONOPOLAR CURVED SCISSORS: Brand: ENDOWRIST

## (undated) DEVICE — PLASTC TOOMEY SYRNG DISP

## (undated) DEVICE — CANNULA SEAL

## (undated) DEVICE — BLAKE SILICONE DRAIN, 15 FR ROUND, HUBLESS: Brand: BLAKE

## (undated) DEVICE — ARM DRAPE

## (undated) DEVICE — INSUFFLATION NEEDLE TO ESTABLISH PNEUMOPERITONEUM.: Brand: INSUFFLATION NEEDLE

## (undated) DEVICE — AIRSEAL 12 MM ACCESS PORT AND PALM GRIP OBTURATOR WITH BLADELESS OPTICAL TIP, 120 MM LENGTH: Brand: AIRSEAL

## (undated) DEVICE — SOL  .9 1000ML BAG

## (undated) NOTE — LETTER
To Whom It May Concern:    Luc Charles has been under our care regarding ongoing medical issues. Because of this, he has been required to restrict her physical activities.     He may resume his usual activities, including work, on 1/11/2021 with t

## (undated) NOTE — LETTER
1/22/2025    Wes [place] [address] [address] [address]           Dear Wes,    This letter is to inform you that our office has made several attempts to reach you by phone without success.  We were attempting to contact you by phone regarding prescription request      Please schedule at your earliest convenience so we can continue to provide refills without interruption.  You can schedule an appointment using the scheduling feature in your [facility] account or by calling [phone], to schedule an appointment say yes to the prompt or say \"appointment\".      Our phone hours  Monday - Friday  8 AM - 4:30 PM    Saturday                     8 AM- 12:00 PM  & Sunday                      Closed        Please contact our office at the number listed below as soon as you receive this letter to discuss this issue and to make the necessary changes in our system to your contact information.  Thank you for your cooperation.        Sincerely,    Oumar Nunez DO  [address] [place] [id number]  Ph: [phone]  Fax: [phone]                         Document electronically generated by:  Taylor ZHANG CPhT

## (undated) NOTE — LETTER
Patient: Melinda Pang   YOB: 1960   Date of Visit: 9/8/2021     Dear  Dr. Amira Joya,    Thank you for referring Melinda Pang to my practice. Please find my assessment and plan below.           Right groin pain  (primary encoun

## (undated) NOTE — LETTER
1/22/2025    [place] [address] [address] [facility] [address]         Dear Wes,    This letter is to inform you that our office has made several attempts to reach you by phone without success.  We were attempting to contact you by phone regarding prescription request    Thank you for your refill request, it is being sent to your provider for review.  Please allow 48-72 hours for us to process this request.    Please schedule at your earliest convenience so we can continue to provide refills without interruption.  You can schedule an appointment using the scheduling feature in your [facility] account or by calling [phone], to schedule an appointment say yes to the prompt or say \"appointment\".      Our phone hours  Monday - Friday  8 AM - 4:30 PM    Saturday                     8 AM- 12:00 PM  & Sunday                      Closed    Thank You,  Caty Vazquez      Please contact our office at the number listed below as soon as you receive this letter to discuss this issue and to make the necessary changes in our system to your contact information.  Thank you for your cooperation.        Sincerely,    Candace Tyler, AMANDA  [address] [facility] [facility] [address]  [facility] [address]  Ph: [phone]  Fax: [phone]                 Document electronically generated by:  Taylor ZHANG CPhT